# Patient Record
Sex: MALE | Race: WHITE | NOT HISPANIC OR LATINO | Employment: FULL TIME | URBAN - METROPOLITAN AREA
[De-identification: names, ages, dates, MRNs, and addresses within clinical notes are randomized per-mention and may not be internally consistent; named-entity substitution may affect disease eponyms.]

---

## 2017-06-06 ENCOUNTER — HISTORICAL (OUTPATIENT)
Dept: ADMINISTRATIVE | Facility: HOSPITAL | Age: 27
End: 2017-06-06

## 2017-06-06 LAB
ABS NEUT (OLG): 4.88 X10(3)/MCL (ref 2.1–9.2)
ABS NEUT (OLG): 4.89 X10(3)/MCL (ref 2.1–9.2)
ALBUMIN SERPL-MCNC: 4.4 GM/DL (ref 3.4–5)
ALBUMIN/GLOB SERPL: 1 RATIO (ref 1–2)
ALP SERPL-CCNC: 48 UNIT/L (ref 20–120)
ALT SERPL-CCNC: 26 UNIT/L
AST SERPL-CCNC: 26 UNIT/L
BASOPHILS # BLD AUTO: 0.07 X10(3)/MCL
BASOPHILS # BLD AUTO: 0.08 X10(3)/MCL
BASOPHILS NFR BLD AUTO: 1 % (ref 0–1)
BASOPHILS NFR BLD AUTO: 1 % (ref 0–1)
BILIRUB SERPL-MCNC: 0.4 MG/DL
BILIRUBIN DIRECT+TOT PNL SERPL-MCNC: <0.1 MG/DL
BILIRUBIN DIRECT+TOT PNL SERPL-MCNC: >0.3 MG/DL
BUN SERPL-MCNC: 17 MG/DL (ref 7–25)
CALCIUM SERPL-MCNC: 9 MG/DL (ref 8.4–10.3)
CD3+CD4+ CELLS # SPEC: 1110 UNIT/L (ref 589–1505)
CD3+CD4+ CELLS NFR BLD: 40.7 % (ref 31–59)
CHLORIDE SERPL-SCNC: 103 MMOL/L (ref 96–110)
CO2 SERPL-SCNC: 30 MMOL/L (ref 24–32)
CREAT SERPL-MCNC: 0.84 MG/DL (ref 0.7–1.4)
ERYTHROCYTE [DISTWIDTH] IN BLOOD BY AUTOMATED COUNT: 14.1 % (ref 11.5–14.5)
ERYTHROCYTE [DISTWIDTH] IN BLOOD BY AUTOMATED COUNT: 14.2 % (ref 11.5–14.5)
GLOBULIN SER-MCNC: 3.1 GM/ML (ref 2.3–3.5)
GLUCOSE SERPL-MCNC: 97 MG/DL (ref 65–99)
HCT VFR BLD AUTO: 42 % (ref 40–51)
HCT VFR BLD AUTO: 42.5 % (ref 40–51)
HCV AB SERPL QL IA: NONREACTIVE
HGB BLD-MCNC: 14 GM/DL (ref 13.5–17.5)
HGB BLD-MCNC: 14.2 GM/DL (ref 13.5–17.5)
IMM GRANULOCYTES # BLD AUTO: 0.02 10*3/UL
IMM GRANULOCYTES # BLD AUTO: 0.02 10*3/UL
IMM GRANULOCYTES NFR BLD AUTO: 0 %
IMM GRANULOCYTES NFR BLD AUTO: 0 %
LYMPHOCYTES # BLD AUTO: 2.69 X10(3)/MCL
LYMPHOCYTES # BLD AUTO: 2.73 X10(3)/MCL
LYMPHOCYTES # BLD AUTO: 2728 UNIT/L (ref 1260–5520)
LYMPHOCYTES NFR BLD AUTO: 31 % (ref 15–40)
LYMPHOCYTES NFR BLD AUTO: 31 % (ref 15–40)
LYMPHOCYTES NFR LN MANUAL: 31 % (ref 28–48)
LYMPHOMA - T-CELL MARKERS SPEC-IMP: NORMAL
MCH RBC QN AUTO: 31 PG (ref 26–34)
MCH RBC QN AUTO: 31.6 PG (ref 26–34)
MCHC RBC AUTO-ENTMCNC: 32.9 GM/DL (ref 31–37)
MCHC RBC AUTO-ENTMCNC: 33.8 GM/DL (ref 31–37)
MCV RBC AUTO: 93.5 FL (ref 80–100)
MCV RBC AUTO: 94.2 FL (ref 80–100)
MONOCYTES # BLD AUTO: 1.04 X10(3)/MCL
MONOCYTES # BLD AUTO: 1.07 X10(3)/MCL
MONOCYTES NFR BLD AUTO: 12 % (ref 4–12)
MONOCYTES NFR BLD AUTO: 12 % (ref 4–12)
NEUTROPHILS # BLD AUTO: 4.88 X10(3)/MCL
NEUTROPHILS # BLD AUTO: 4.89 X10(3)/MCL
NEUTROPHILS NFR BLD AUTO: 56 X10(3)/MCL
NEUTROPHILS NFR BLD AUTO: 56 X10(3)/MCL
PLATELET # BLD AUTO: 278 X10(3)/MCL (ref 130–400)
PLATELET # BLD AUTO: 286 X10(3)/MCL (ref 130–400)
PMV BLD AUTO: 8.8 FL (ref 7.4–10.4)
PMV BLD AUTO: 9.2 FL (ref 7.4–10.4)
POTASSIUM SERPL-SCNC: 3.7 MMOL/L (ref 3.6–5.2)
PROT SERPL-MCNC: 7.5 GM/DL (ref 6–8)
RBC # BLD AUTO: 4.49 X10(6)/MCL (ref 4.5–5.9)
RBC # BLD AUTO: 4.51 X10(6)/MCL (ref 4.5–5.9)
RPR SER QL: REACTIVE
SODIUM SERPL-SCNC: 140 MMOL/L (ref 135–146)
WBC # BLD AUTO: 8800 /MM3 (ref 4500–11500)
WBC # SPEC AUTO: 8.7 X10(3)/MCL (ref 4.5–11)
WBC # SPEC AUTO: 8.8 X10(3)/MCL (ref 4.5–11)

## 2017-09-11 ENCOUNTER — HISTORICAL (OUTPATIENT)
Dept: ADMINISTRATIVE | Facility: HOSPITAL | Age: 27
End: 2017-09-11

## 2017-09-11 LAB
ABS NEUT (OLG): 3.86 X10(3)/MCL (ref 2.1–9.2)
ABS NEUT (OLG): 4.03 X10(3)/MCL (ref 2.1–9.2)
ALBUMIN SERPL-MCNC: 3.9 GM/DL (ref 3.4–5)
ALBUMIN/GLOB SERPL: 1 RATIO (ref 1–2)
ALP SERPL-CCNC: 70 UNIT/L (ref 45–117)
ALT SERPL-CCNC: 23 UNIT/L (ref 12–78)
AST SERPL-CCNC: 19 UNIT/L (ref 15–37)
BASOPHILS # BLD AUTO: 0.05 X10(3)/MCL
BASOPHILS # BLD AUTO: 0.06 X10(3)/MCL
BASOPHILS NFR BLD AUTO: 1 % (ref 0–1)
BASOPHILS NFR BLD AUTO: 1 % (ref 0–1)
BILIRUB SERPL-MCNC: 0.3 MG/DL (ref 0.2–1)
BILIRUBIN DIRECT+TOT PNL SERPL-MCNC: 0.1 MG/DL
BILIRUBIN DIRECT+TOT PNL SERPL-MCNC: 0.2 MG/DL
BUN SERPL-MCNC: 11 MG/DL (ref 7–18)
CALCIUM SERPL-MCNC: 8.9 MG/DL (ref 8.5–10.1)
CD3+CD4+ CELLS # SPEC: 708 UNIT/L (ref 589–1505)
CD3+CD4+ CELLS NFR BLD: 42.9 % (ref 31–59)
CHLORIDE SERPL-SCNC: 106 MMOL/L (ref 98–107)
CO2 SERPL-SCNC: 30 MMOL/L (ref 21–32)
CREAT SERPL-MCNC: 0.8 MG/DL (ref 0.6–1.3)
ERYTHROCYTE [DISTWIDTH] IN BLOOD BY AUTOMATED COUNT: 13.6 % (ref 11.5–14.5)
ERYTHROCYTE [DISTWIDTH] IN BLOOD BY AUTOMATED COUNT: 13.7 % (ref 11.5–14.5)
GLOBULIN SER-MCNC: 4.4 GM/ML (ref 2.3–3.5)
GLUCOSE SERPL-MCNC: 91 MG/DL (ref 74–106)
HCT VFR BLD AUTO: 40.8 % (ref 40–51)
HCT VFR BLD AUTO: 40.9 % (ref 40–51)
HGB BLD-MCNC: 13.5 GM/DL (ref 13.5–17.5)
HGB BLD-MCNC: 13.7 GM/DL (ref 13.5–17.5)
IMM GRANULOCYTES # BLD AUTO: 0.05 10*3/UL
IMM GRANULOCYTES # BLD AUTO: 0.07 10*3/UL
IMM GRANULOCYTES NFR BLD AUTO: 1 %
IMM GRANULOCYTES NFR BLD AUTO: 1 %
LYMPHOCYTES # BLD AUTO: 1.63 X10(3)/MCL
LYMPHOCYTES # BLD AUTO: 1.65 X10(3)/MCL
LYMPHOCYTES # BLD AUTO: 1650 UNIT/L (ref 1260–5520)
LYMPHOCYTES NFR BLD AUTO: 24 % (ref 15–40)
LYMPHOCYTES NFR BLD AUTO: 25 % (ref 15–40)
LYMPHOCYTES NFR LN MANUAL: 25 % (ref 28–48)
LYMPHOMA - T-CELL MARKERS SPEC-IMP: ABNORMAL
MCH RBC QN AUTO: 30.9 PG (ref 26–34)
MCH RBC QN AUTO: 31.4 PG (ref 26–34)
MCHC RBC AUTO-ENTMCNC: 33.1 GM/DL (ref 31–37)
MCHC RBC AUTO-ENTMCNC: 33.5 GM/DL (ref 31–37)
MCV RBC AUTO: 93.4 FL (ref 80–100)
MCV RBC AUTO: 93.8 FL (ref 80–100)
MONOCYTES # BLD AUTO: 1.01 X10(3)/MCL
MONOCYTES # BLD AUTO: 1.05 X10(3)/MCL
MONOCYTES NFR BLD AUTO: 15 % (ref 4–12)
MONOCYTES NFR BLD AUTO: 15 % (ref 4–12)
NEUTROPHILS # BLD AUTO: 3.86 X10(3)/MCL
NEUTROPHILS # BLD AUTO: 4.03 X10(3)/MCL
NEUTROPHILS NFR BLD AUTO: 58 X10(3)/MCL
NEUTROPHILS NFR BLD AUTO: 59 X10(3)/MCL
PLATELET # BLD AUTO: 313 X10(3)/MCL (ref 130–400)
PLATELET # BLD AUTO: 321 X10(3)/MCL (ref 130–400)
PMV BLD AUTO: 9.1 FL (ref 7.4–10.4)
PMV BLD AUTO: 9.1 FL (ref 7.4–10.4)
POTASSIUM SERPL-SCNC: 3.9 MMOL/L (ref 3.5–5.1)
PROT SERPL-MCNC: 8.3 GM/DL (ref 6.4–8.2)
RBC # BLD AUTO: 4.36 X10(6)/MCL (ref 4.5–5.9)
RBC # BLD AUTO: 4.37 X10(6)/MCL (ref 4.5–5.9)
SODIUM SERPL-SCNC: 141 MMOL/L (ref 136–145)
WBC # BLD AUTO: 6600 /MM3 (ref 4500–11500)
WBC # SPEC AUTO: 6.6 X10(3)/MCL (ref 4.5–11)
WBC # SPEC AUTO: 6.8 X10(3)/MCL (ref 4.5–11)

## 2017-10-17 LAB
RAPID GROUP A STREP (OHS): NEGATIVE
RPR SER QL: REACTIVE
T PALLIDUM AB SER QL: REACTIVE

## 2018-02-14 ENCOUNTER — HISTORICAL (OUTPATIENT)
Dept: ADMINISTRATIVE | Facility: HOSPITAL | Age: 28
End: 2018-02-14

## 2018-02-14 LAB
ABS NEUT (OLG): 5.17 X10(3)/MCL (ref 2.1–9.2)
ABS NEUT (OLG): 5.37 X10(3)/MCL (ref 2.1–9.2)
ALBUMIN SERPL-MCNC: 3.8 GM/DL (ref 3.4–5)
ALBUMIN/GLOB SERPL: 1 RATIO (ref 1–2)
ALP SERPL-CCNC: 53 UNIT/L (ref 45–117)
ALT SERPL-CCNC: 26 UNIT/L (ref 12–78)
AST SERPL-CCNC: 14 UNIT/L (ref 15–37)
BASOPHILS # BLD AUTO: 0.08 X10(3)/MCL
BASOPHILS # BLD AUTO: 0.1 X10(3)/MCL
BASOPHILS NFR BLD AUTO: 1 % (ref 0–1)
BASOPHILS NFR BLD AUTO: 1 % (ref 0–1)
BILIRUB SERPL-MCNC: 0.2 MG/DL (ref 0.2–1)
BILIRUBIN DIRECT+TOT PNL SERPL-MCNC: <0.1 MG/DL
BILIRUBIN DIRECT+TOT PNL SERPL-MCNC: ABNORMAL MG/DL
BUN SERPL-MCNC: 17 MG/DL (ref 7–18)
CALCIUM SERPL-MCNC: 8.7 MG/DL (ref 8.5–10.1)
CD3+CD4+ CELLS # SPEC: 1033 UNIT/L (ref 589–1505)
CD3+CD4+ CELLS NFR BLD: 41.4 % (ref 31–59)
CHLORIDE SERPL-SCNC: 106 MMOL/L (ref 98–107)
CO2 SERPL-SCNC: 28 MMOL/L (ref 21–32)
CREAT SERPL-MCNC: 0.7 MG/DL (ref 0.6–1.3)
DEPRECATED CALCIDIOL+CALCIFEROL SERPL-MC: 20.12 NG/ML (ref 30–80)
EOSINOPHIL # BLD AUTO: 0.01 X10(3)/MCL
EOSINOPHIL NFR BLD AUTO: 0 % (ref 0–5)
ERYTHROCYTE [DISTWIDTH] IN BLOOD BY AUTOMATED COUNT: 13.8 % (ref 11.5–14.5)
ERYTHROCYTE [DISTWIDTH] IN BLOOD BY AUTOMATED COUNT: 13.9 % (ref 11.5–14.5)
GLOBULIN SER-MCNC: 4.2 GM/ML (ref 2.3–3.5)
GLUCOSE SERPL-MCNC: 89 MG/DL (ref 74–106)
HCT VFR BLD AUTO: 43.4 % (ref 40–51)
HCT VFR BLD AUTO: 43.7 % (ref 40–51)
HCV AB SERPL QL IA: NONREACTIVE
HGB BLD-MCNC: 14.2 GM/DL (ref 13.5–17.5)
HGB BLD-MCNC: 14.3 GM/DL (ref 13.5–17.5)
IMM GRANULOCYTES # BLD AUTO: 0.37 10*3/UL
IMM GRANULOCYTES # BLD AUTO: 0.46 10*3/UL
IMM GRANULOCYTES NFR BLD AUTO: 4 %
IMM GRANULOCYTES NFR BLD AUTO: 5 %
LYMPHOCYTES # BLD AUTO: 2.45 X10(3)/MCL
LYMPHOCYTES # BLD AUTO: 2.49 X10(3)/MCL
LYMPHOCYTES # BLD AUTO: 2496 UNIT/L (ref 1260–5520)
LYMPHOCYTES NFR BLD AUTO: 26 % (ref 15–40)
LYMPHOCYTES NFR BLD AUTO: 26 % (ref 15–40)
LYMPHOCYTES NFR LN MANUAL: 26 % (ref 28–48)
LYMPHOMA - T-CELL MARKERS SPEC-IMP: ABNORMAL
MCH RBC QN AUTO: 30.7 PG (ref 26–34)
MCH RBC QN AUTO: 31.1 PG (ref 26–34)
MCHC RBC AUTO-ENTMCNC: 32.5 GM/DL (ref 31–37)
MCHC RBC AUTO-ENTMCNC: 32.9 GM/DL (ref 31–37)
MCV RBC AUTO: 94.3 FL (ref 80–100)
MCV RBC AUTO: 94.4 FL (ref 80–100)
MONOCYTES # BLD AUTO: 1.36 X10(3)/MCL
MONOCYTES # BLD AUTO: 1.4 X10(3)/MCL
MONOCYTES NFR BLD AUTO: 14 % (ref 4–12)
MONOCYTES NFR BLD AUTO: 15 % (ref 4–12)
NEUTROPHILS # BLD AUTO: 5.17 X10(3)/MCL
NEUTROPHILS # BLD AUTO: 5.37 X10(3)/MCL
NEUTROPHILS NFR BLD AUTO: 54 X10(3)/MCL
NEUTROPHILS NFR BLD AUTO: 56 X10(3)/MCL
PLATELET # BLD AUTO: 355 X10(3)/MCL (ref 130–400)
PLATELET # BLD AUTO: 369 X10(3)/MCL (ref 130–400)
PMV BLD AUTO: 9.1 FL (ref 7.4–10.4)
PMV BLD AUTO: 9.2 FL (ref 7.4–10.4)
POTASSIUM SERPL-SCNC: 4.3 MMOL/L (ref 3.5–5.1)
PROT SERPL-MCNC: 8 GM/DL (ref 6.4–8.2)
RBC # BLD AUTO: 4.6 X10(6)/MCL (ref 4.5–5.9)
RBC # BLD AUTO: 4.63 X10(6)/MCL (ref 4.5–5.9)
RPR SER QL: REACTIVE
SODIUM SERPL-SCNC: 138 MMOL/L (ref 136–145)
T PALLIDUM AB SER QL: REACTIVE
TSH SERPL-ACNC: 1.82 MIU/L (ref 0.36–3.74)
WBC # BLD AUTO: 9600 /MM3 (ref 4500–11500)
WBC # SPEC AUTO: 9.6 X10(3)/MCL (ref 4.5–11)
WBC # SPEC AUTO: 9.7 X10(3)/MCL (ref 4.5–11)

## 2018-05-18 ENCOUNTER — HISTORICAL (OUTPATIENT)
Dept: ADMINISTRATIVE | Facility: HOSPITAL | Age: 28
End: 2018-05-18

## 2018-05-18 LAB
ABS NEUT (OLG): 4.33 X10(3)/MCL (ref 2.1–9.2)
ABS NEUT (OLG): 4.51 X10(3)/MCL (ref 2.1–9.2)
ALBUMIN SERPL-MCNC: 4.6 GM/DL (ref 3.4–5)
ALBUMIN/GLOB SERPL: 1 RATIO (ref 1–2)
ALP SERPL-CCNC: 55 UNIT/L (ref 45–117)
ALT SERPL-CCNC: 32 UNIT/L (ref 12–78)
AST SERPL-CCNC: 27 UNIT/L (ref 15–37)
BASOPHILS # BLD AUTO: 0.02 X10(3)/MCL
BASOPHILS # BLD AUTO: 0.02 X10(3)/MCL
BASOPHILS NFR BLD AUTO: 0 %
BASOPHILS NFR BLD AUTO: 0 %
BILIRUB SERPL-MCNC: 0.4 MG/DL (ref 0.2–1)
BILIRUBIN DIRECT+TOT PNL SERPL-MCNC: 0.2 MG/DL
BILIRUBIN DIRECT+TOT PNL SERPL-MCNC: 0.2 MG/DL
BUN SERPL-MCNC: 13 MG/DL (ref 7–18)
CALCIUM SERPL-MCNC: 9.4 MG/DL (ref 8.5–10.1)
CD3+CD4+ CELLS # SPEC: 741 UNIT/L (ref 589–1505)
CD3+CD4+ CELLS NFR BLD: 38.5 % (ref 31–59)
CHLORIDE SERPL-SCNC: 106 MMOL/L (ref 98–107)
CHOLEST SERPL-MCNC: 174 MG/DL
CHOLEST/HDLC SERPL: 4.5 {RATIO} (ref 0–5)
CO2 SERPL-SCNC: 27 MMOL/L (ref 21–32)
CREAT SERPL-MCNC: 1 MG/DL (ref 0.6–1.3)
ERYTHROCYTE [DISTWIDTH] IN BLOOD BY AUTOMATED COUNT: 13.5 % (ref 11.5–14.5)
ERYTHROCYTE [DISTWIDTH] IN BLOOD BY AUTOMATED COUNT: 13.7 % (ref 11.5–14.5)
EST. AVERAGE GLUCOSE BLD GHB EST-MCNC: 103 MG/DL
GLOBULIN SER-MCNC: 3.7 GM/ML (ref 2.3–3.5)
GLUCOSE SERPL-MCNC: 91 MG/DL (ref 74–106)
HBA1C MFR BLD: 5.2 % (ref 4.2–6.3)
HCT VFR BLD AUTO: 47.2 % (ref 40–51)
HCT VFR BLD AUTO: 48.5 % (ref 40–51)
HDLC SERPL-MCNC: 39 MG/DL
HGB BLD-MCNC: 15.5 GM/DL (ref 13.5–17.5)
HGB BLD-MCNC: 15.7 GM/DL (ref 13.5–17.5)
IMM GRANULOCYTES # BLD AUTO: 0.02 10*3/UL
IMM GRANULOCYTES # BLD AUTO: 0.03 10*3/UL
IMM GRANULOCYTES NFR BLD AUTO: 0 %
IMM GRANULOCYTES NFR BLD AUTO: 0 %
LDLC SERPL CALC-MCNC: 113 MG/DL (ref 0–130)
LYMPHOCYTES # BLD AUTO: 1.8 X10(3)/MCL
LYMPHOCYTES # BLD AUTO: 1.94 X10(3)/MCL
LYMPHOCYTES # BLD AUTO: 1924 UNIT/L (ref 1260–5520)
LYMPHOCYTES NFR BLD AUTO: 26 % (ref 13–40)
LYMPHOCYTES NFR BLD AUTO: 26 % (ref 13–40)
LYMPHOCYTES NFR LN MANUAL: 26 % (ref 28–48)
LYMPHOMA - T-CELL MARKERS SPEC-IMP: ABNORMAL
MCH RBC QN AUTO: 30.2 PG (ref 26–34)
MCH RBC QN AUTO: 30.6 PG (ref 26–34)
MCHC RBC AUTO-ENTMCNC: 32.4 GM/DL (ref 31–37)
MCHC RBC AUTO-ENTMCNC: 32.8 GM/DL (ref 31–37)
MCV RBC AUTO: 93.1 FL (ref 80–100)
MCV RBC AUTO: 93.3 FL (ref 80–100)
MONOCYTES # BLD AUTO: 0.88 X10(3)/MCL
MONOCYTES # BLD AUTO: 0.96 X10(3)/MCL
MONOCYTES NFR BLD AUTO: 12 % (ref 4–12)
MONOCYTES NFR BLD AUTO: 13 % (ref 4–12)
NEUTROPHILS # BLD AUTO: 4.33 X10(3)/MCL
NEUTROPHILS # BLD AUTO: 4.51 X10(3)/MCL
NEUTROPHILS NFR BLD AUTO: 60 X10(3)/MCL
NEUTROPHILS NFR BLD AUTO: 61 X10(3)/MCL
PLATELET # BLD AUTO: 264 X10(3)/MCL (ref 130–400)
PLATELET # BLD AUTO: 265 X10(3)/MCL (ref 130–400)
PMV BLD AUTO: 10 FL (ref 7.4–10.4)
PMV BLD AUTO: 9.9 FL (ref 7.4–10.4)
POTASSIUM SERPL-SCNC: 3.9 MMOL/L (ref 3.5–5.1)
PROT SERPL-MCNC: 8.3 GM/DL (ref 6.4–8.2)
RBC # BLD AUTO: 5.07 X10(6)/MCL (ref 4.5–5.9)
RBC # BLD AUTO: 5.2 X10(6)/MCL (ref 4.5–5.9)
RPR SER QL: REACTIVE
SODIUM SERPL-SCNC: 137 MMOL/L (ref 136–145)
T PALLIDUM AB SER QL: REACTIVE
TRIGL SERPL-MCNC: 109 MG/DL
VLDLC SERPL CALC-MCNC: 22 MG/DL
WBC # BLD AUTO: 7400 /MM3 (ref 4500–11500)
WBC # SPEC AUTO: 7.1 X10(3)/MCL (ref 4.5–11)
WBC # SPEC AUTO: 7.4 X10(3)/MCL (ref 4.5–11)

## 2018-08-29 ENCOUNTER — HISTORICAL (OUTPATIENT)
Dept: ADMINISTRATIVE | Facility: HOSPITAL | Age: 28
End: 2018-08-29

## 2018-08-29 LAB
ABS NEUT (OLG): 5.42 X10(3)/MCL (ref 2.1–9.2)
ALBUMIN SERPL-MCNC: 4.3 GM/DL (ref 3.4–5)
ALBUMIN/GLOB SERPL: 1 RATIO (ref 1–2)
ALP SERPL-CCNC: 69 UNIT/L (ref 45–117)
ALT SERPL-CCNC: 20 UNIT/L (ref 12–78)
AST SERPL-CCNC: 14 UNIT/L (ref 15–37)
BASOPHILS # BLD AUTO: 0.05 X10(3)/MCL
BASOPHILS NFR BLD AUTO: 1 %
BILIRUB SERPL-MCNC: 0.4 MG/DL (ref 0.2–1)
BILIRUBIN DIRECT+TOT PNL SERPL-MCNC: 0.1 MG/DL
BILIRUBIN DIRECT+TOT PNL SERPL-MCNC: 0.3 MG/DL
BUN SERPL-MCNC: 12 MG/DL (ref 7–18)
CALCIUM SERPL-MCNC: 8.7 MG/DL (ref 8.5–10.1)
CD3+CD4+ CELLS # SPEC: 589 UNIT/L (ref 589–1505)
CD3+CD4+ CELLS NFR BLD: 40.9 % (ref 31–59)
CHLORIDE SERPL-SCNC: 102 MMOL/L (ref 98–107)
CO2 SERPL-SCNC: 30 MMOL/L (ref 21–32)
CREAT SERPL-MCNC: 0.9 MG/DL (ref 0.6–1.3)
ERYTHROCYTE [DISTWIDTH] IN BLOOD BY AUTOMATED COUNT: 13.8 % (ref 11.5–14.5)
GLOBULIN SER-MCNC: 4.3 GM/ML (ref 2.3–3.5)
GLUCOSE SERPL-MCNC: 94 MG/DL (ref 74–106)
HCT VFR BLD AUTO: 42.6 % (ref 40–51)
HGB BLD-MCNC: 14.5 GM/DL (ref 13.5–17.5)
IMM GRANULOCYTES # BLD AUTO: 0.06 10*3/UL
IMM GRANULOCYTES NFR BLD AUTO: 1 %
LYMPHOCYTES # BLD AUTO: 1.4 X10(3)/MCL
LYMPHOCYTES # BLD AUTO: 1440 UNIT/L (ref 1260–5520)
LYMPHOCYTES NFR BLD AUTO: 18 % (ref 13–40)
LYMPHOCYTES NFR LN MANUAL: 18 % (ref 28–48)
LYMPHOMA - T-CELL MARKERS SPEC-IMP: ABNORMAL
MCH RBC QN AUTO: 31.9 PG (ref 26–34)
MCHC RBC AUTO-ENTMCNC: 34 GM/DL (ref 31–37)
MCV RBC AUTO: 93.8 FL (ref 80–100)
MONOCYTES # BLD AUTO: 1.08 X10(3)/MCL
MONOCYTES NFR BLD AUTO: 14 % (ref 4–12)
NEUTROPHILS # BLD AUTO: 5.42 X10(3)/MCL
NEUTROPHILS NFR BLD AUTO: 68 X10(3)/MCL
PLATELET # BLD AUTO: 279 X10(3)/MCL (ref 130–400)
PMV BLD AUTO: 9.3 FL (ref 7.4–10.4)
POTASSIUM SERPL-SCNC: 3.4 MMOL/L (ref 3.5–5.1)
PROT SERPL-MCNC: 8.6 GM/DL (ref 6.4–8.2)
RBC # BLD AUTO: 4.54 X10(6)/MCL (ref 4.5–5.9)
RPR SER QL: REACTIVE
SODIUM SERPL-SCNC: 136 MMOL/L (ref 136–145)
T PALLIDUM AB SER QL: REACTIVE
WBC # BLD AUTO: 8000 /MM3 (ref 4500–11500)
WBC # SPEC AUTO: 8 X10(3)/MCL (ref 4.5–11)

## 2019-02-14 ENCOUNTER — HISTORICAL (OUTPATIENT)
Dept: ADMINISTRATIVE | Facility: HOSPITAL | Age: 29
End: 2019-02-14

## 2019-02-14 LAB
ABS NEUT (OLG): 3.92 X10(3)/MCL (ref 2.1–9.2)
ALBUMIN SERPL-MCNC: 4.2 GM/DL (ref 3.4–5)
ALBUMIN/GLOB SERPL: 1.2 RATIO (ref 1.1–2)
ALP SERPL-CCNC: 50 UNIT/L (ref 45–117)
ALT SERPL-CCNC: 24 UNIT/L (ref 12–78)
AST SERPL-CCNC: 16 UNIT/L (ref 15–37)
BASOPHILS # BLD AUTO: 0.05 X10(3)/MCL
BASOPHILS NFR BLD AUTO: 1 %
BILIRUB SERPL-MCNC: 0.5 MG/DL (ref 0.2–1)
BILIRUBIN DIRECT+TOT PNL SERPL-MCNC: 0.1 MG/DL
BILIRUBIN DIRECT+TOT PNL SERPL-MCNC: 0.4 MG/DL
BUN SERPL-MCNC: 12 MG/DL (ref 7–18)
CALCIUM SERPL-MCNC: 8.9 MG/DL (ref 8.5–10.1)
CD3+CD4+ CELLS # SPEC: 1021 UNIT/L (ref 589–1505)
CD3+CD4+ CELLS NFR BLD: 40.6 % (ref 31–59)
CHLORIDE SERPL-SCNC: 105 MMOL/L (ref 98–107)
CO2 SERPL-SCNC: 33 MMOL/L (ref 21–32)
CREAT SERPL-MCNC: 0.9 MG/DL (ref 0.6–1.3)
EOSINOPHIL # BLD AUTO: 0.01 X10(3)/MCL
EOSINOPHIL NFR BLD AUTO: 0 %
ERYTHROCYTE [DISTWIDTH] IN BLOOD BY AUTOMATED COUNT: 13.3 % (ref 11.5–14.5)
GLOBULIN SER-MCNC: 3.6 GM/ML (ref 2.3–3.5)
GLUCOSE SERPL-MCNC: 91 MG/DL (ref 74–106)
HCT VFR BLD AUTO: 43.4 % (ref 40–51)
HCV AB SERPL QL IA: NONREACTIVE
HGB BLD-MCNC: 14.4 GM/DL (ref 13.5–17.5)
IMM GRANULOCYTES # BLD AUTO: 0.01 10*3/UL
IMM GRANULOCYTES NFR BLD AUTO: 0 %
LYMPHOCYTES # BLD AUTO: 2.49 X10(3)/MCL
LYMPHOCYTES # BLD AUTO: 2516 UNIT/L (ref 1260–5520)
LYMPHOCYTES NFR BLD AUTO: 34 % (ref 13–40)
LYMPHOCYTES NFR LN MANUAL: 34 % (ref 28–48)
LYMPHOMA - T-CELL MARKERS SPEC-IMP: NORMAL
MCH RBC QN AUTO: 31.9 PG (ref 26–34)
MCHC RBC AUTO-ENTMCNC: 33.2 GM/DL (ref 31–37)
MCV RBC AUTO: 96 FL (ref 80–100)
MONOCYTES # BLD AUTO: 0.91 X10(3)/MCL
MONOCYTES NFR BLD AUTO: 12 % (ref 4–12)
NEUTROPHILS # BLD AUTO: 3.92 X10(3)/MCL
NEUTROPHILS NFR BLD AUTO: 53 X10(3)/MCL
PLATELET # BLD AUTO: 232 X10(3)/MCL (ref 130–400)
PMV BLD AUTO: 9.7 FL (ref 7.4–10.4)
POTASSIUM SERPL-SCNC: 3.5 MMOL/L (ref 3.5–5.1)
PROT SERPL-MCNC: 7.8 GM/DL (ref 6.4–8.2)
RBC # BLD AUTO: 4.52 X10(6)/MCL (ref 4.5–5.9)
RPR SER QL: REACTIVE
SODIUM SERPL-SCNC: 141 MMOL/L (ref 136–145)
T PALLIDUM AB SER QL: REACTIVE
WBC # BLD AUTO: 7400 /MM3 (ref 4500–11500)
WBC # SPEC AUTO: 7.4 X10(3)/MCL (ref 4.5–11)

## 2019-05-16 ENCOUNTER — HISTORICAL (OUTPATIENT)
Dept: ADMINISTRATIVE | Facility: HOSPITAL | Age: 29
End: 2019-05-16

## 2019-05-16 LAB
ABS NEUT (OLG): 4.93 X10(3)/MCL (ref 2.1–9.2)
ALBUMIN SERPL-MCNC: 4.2 GM/DL (ref 3.4–5)
ALBUMIN/GLOB SERPL: 1.2 RATIO (ref 1.1–2)
ALP SERPL-CCNC: 48 UNIT/L (ref 45–117)
ALT SERPL-CCNC: 31 UNIT/L (ref 12–78)
APPEARANCE, UA: CLEAR
AST SERPL-CCNC: 23 UNIT/L (ref 15–37)
BACTERIA #/AREA URNS AUTO: ABNORMAL /[HPF]
BASOPHILS # BLD AUTO: 0.05 X10(3)/MCL
BASOPHILS NFR BLD AUTO: 1 %
BILIRUB SERPL-MCNC: 0.4 MG/DL (ref 0.2–1)
BILIRUB UR QL STRIP: NEGATIVE
BILIRUBIN DIRECT+TOT PNL SERPL-MCNC: 0.1 MG/DL
BILIRUBIN DIRECT+TOT PNL SERPL-MCNC: 0.3 MG/DL
BUN SERPL-MCNC: 16 MG/DL (ref 7–18)
CALCIUM SERPL-MCNC: 8.9 MG/DL (ref 8.5–10.1)
CD3+CD4+ CELLS # SPEC: 857 UNIT/L (ref 589–1505)
CD3+CD4+ CELLS NFR BLD: 41.8 % (ref 31–59)
CHLORIDE SERPL-SCNC: 104 MMOL/L (ref 98–107)
CHOLEST SERPL-MCNC: 169 MG/DL
CHOLEST/HDLC SERPL: 3 {RATIO} (ref 0–5)
CO2 SERPL-SCNC: 30 MMOL/L (ref 21–32)
COLOR UR: YELLOW
CREAT SERPL-MCNC: 0.9 MG/DL (ref 0.6–1.3)
DEPRECATED CALCIDIOL+CALCIFEROL SERPL-MC: 25.98 NG/ML (ref 30–80)
EOSINOPHIL # BLD AUTO: 0.01 X10(3)/MCL
EOSINOPHIL NFR BLD AUTO: 0 %
ERYTHROCYTE [DISTWIDTH] IN BLOOD BY AUTOMATED COUNT: 13.2 % (ref 11.5–14.5)
EST. AVERAGE GLUCOSE BLD GHB EST-MCNC: 103 MG/DL
GLOBULIN SER-MCNC: 3.6 GM/ML (ref 2.3–3.5)
GLUCOSE (UA): NORMAL
GLUCOSE SERPL-MCNC: 104 MG/DL (ref 74–106)
HBA1C MFR BLD: 5.2 % (ref 4.2–6.3)
HCT VFR BLD AUTO: 42.2 % (ref 40–51)
HCV AB SERPL QL IA: NONREACTIVE
HDLC SERPL-MCNC: 56 MG/DL
HGB BLD-MCNC: 13.9 GM/DL (ref 13.5–17.5)
HGB UR QL STRIP: NEGATIVE
HYALINE CASTS #/AREA URNS LPF: ABNORMAL /[LPF]
IMM GRANULOCYTES # BLD AUTO: 0.02 10*3/UL
IMM GRANULOCYTES NFR BLD AUTO: 0 %
KETONES UR QL STRIP: ABNORMAL
LDLC SERPL CALC-MCNC: 101 MG/DL (ref 0–130)
LEUKOCYTE ESTERASE UR QL STRIP: NEGATIVE
LYMPHOCYTES # BLD AUTO: 2.04 X10(3)/MCL
LYMPHOCYTES # BLD AUTO: 2050 UNIT/L (ref 1260–5520)
LYMPHOCYTES NFR BLD AUTO: 25 % (ref 13–40)
LYMPHOCYTES NFR LN MANUAL: 25 % (ref 28–48)
LYMPHOMA - T-CELL MARKERS SPEC-IMP: ABNORMAL
MCH RBC QN AUTO: 31.9 PG (ref 26–34)
MCHC RBC AUTO-ENTMCNC: 32.9 GM/DL (ref 31–37)
MCV RBC AUTO: 96.8 FL (ref 80–100)
MONOCYTES # BLD AUTO: 1.18 X10(3)/MCL
MONOCYTES NFR BLD AUTO: 14 % (ref 4–12)
NEUTROPHILS # BLD AUTO: 4.93 X10(3)/MCL
NEUTROPHILS NFR BLD AUTO: 60 X10(3)/MCL
NITRITE UR QL STRIP: NEGATIVE
PH UR STRIP: 5.5 [PH] (ref 4.5–8)
PLATELET # BLD AUTO: 257 X10(3)/MCL (ref 130–400)
PMV BLD AUTO: 9.6 FL (ref 7.4–10.4)
POTASSIUM SERPL-SCNC: 4 MMOL/L (ref 3.5–5.1)
PROT SERPL-MCNC: 7.8 GM/DL (ref 6.4–8.2)
PROT UR QL STRIP: 50 MG/DL
RBC # BLD AUTO: 4.36 X10(6)/MCL (ref 4.5–5.9)
RBC #/AREA URNS AUTO: ABNORMAL /[HPF]
RPR SER QL: REACTIVE
SODIUM SERPL-SCNC: 137 MMOL/L (ref 136–145)
SP GR UR STRIP: 1.04 (ref 1–1.03)
SQUAMOUS #/AREA URNS LPF: ABNORMAL /[LPF]
T PALLIDUM AB SER QL: REACTIVE
TRIGL SERPL-MCNC: 59 MG/DL
TSH SERPL-ACNC: 1.49 MIU/L (ref 0.36–3.74)
UROBILINOGEN UR STRIP-ACNC: 2 MG/DL
VLDLC SERPL CALC-MCNC: 12 MG/DL
WBC # BLD AUTO: 8200 /MM3 (ref 4500–11500)
WBC # SPEC AUTO: 8.2 X10(3)/MCL (ref 4.5–11)
WBC #/AREA URNS AUTO: ABNORMAL /HPF

## 2019-08-19 ENCOUNTER — HISTORICAL (OUTPATIENT)
Dept: ADMINISTRATIVE | Facility: HOSPITAL | Age: 29
End: 2019-08-19

## 2019-08-19 LAB
ABS NEUT (OLG): 5.26 X10(3)/MCL (ref 2.1–9.2)
ALBUMIN SERPL-MCNC: 4.7 GM/DL (ref 3.4–5)
ALBUMIN/GLOB SERPL: 1.2 RATIO (ref 1.1–2)
ALP SERPL-CCNC: 53 UNIT/L (ref 45–117)
ALT SERPL-CCNC: 34 UNIT/L (ref 12–78)
AST SERPL-CCNC: 28 UNIT/L (ref 15–37)
BASOPHILS # BLD AUTO: 0.09 X10(3)/MCL
BASOPHILS NFR BLD AUTO: 1 %
BILIRUB SERPL-MCNC: 0.9 MG/DL (ref 0.2–1)
BILIRUBIN DIRECT+TOT PNL SERPL-MCNC: 0.2 MG/DL
BILIRUBIN DIRECT+TOT PNL SERPL-MCNC: 0.7 MG/DL
BUN SERPL-MCNC: 17 MG/DL (ref 7–18)
CALCIUM SERPL-MCNC: 9.2 MG/DL (ref 8.5–10.1)
CD3+CD4+ CELLS # SPEC: 1086 UNIT/L (ref 589–1505)
CD3+CD4+ CELLS NFR BLD: 40 % (ref 31–59)
CHLORIDE SERPL-SCNC: 105 MMOL/L (ref 98–107)
CO2 SERPL-SCNC: 28 MMOL/L (ref 21–32)
CREAT SERPL-MCNC: 0.9 MG/DL (ref 0.6–1.3)
EOSINOPHIL # BLD AUTO: 0.06 10*3/UL
EOSINOPHIL NFR BLD AUTO: 1 %
ERYTHROCYTE [DISTWIDTH] IN BLOOD BY AUTOMATED COUNT: 13.4 % (ref 11.5–14.5)
GLOBULIN SER-MCNC: 3.8 GM/ML (ref 2.3–3.5)
GLUCOSE SERPL-MCNC: 87 MG/DL (ref 74–106)
HCT VFR BLD AUTO: 44.5 % (ref 40–51)
HCV AB SERPL QL IA: NONREACTIVE
HGB BLD-MCNC: 14.7 GM/DL (ref 13.5–17.5)
IMM GRANULOCYTES # BLD AUTO: 0.03 10*3/UL
IMM GRANULOCYTES NFR BLD AUTO: 0 %
LYMPHOCYTES # BLD AUTO: 2.66 X10(3)/MCL
LYMPHOCYTES # BLD AUTO: 2716 UNIT/L (ref 1260–5520)
LYMPHOCYTES NFR BLD AUTO: 28 % (ref 13–40)
LYMPHOCYTES NFR LN MANUAL: 28 % (ref 28–48)
LYMPHOMA - T-CELL MARKERS SPEC-IMP: NORMAL
MCH RBC QN AUTO: 31.5 PG (ref 26–34)
MCHC RBC AUTO-ENTMCNC: 33 GM/DL (ref 31–37)
MCV RBC AUTO: 95.5 FL (ref 80–100)
MONOCYTES # BLD AUTO: 1.58 X10(3)/MCL
MONOCYTES NFR BLD AUTO: 16 % (ref 0–24)
NEUTROPHILS # BLD AUTO: 5.26 X10(3)/MCL
NEUTROPHILS NFR BLD AUTO: 54 X10(3)/MCL
PLATELET # BLD AUTO: 273 X10(3)/MCL (ref 130–400)
PMV BLD AUTO: 9.4 FL (ref 7.4–10.4)
POTASSIUM SERPL-SCNC: 3.8 MMOL/L (ref 3.5–5.1)
PROT SERPL-MCNC: 8.5 GM/DL (ref 6.4–8.2)
RBC # BLD AUTO: 4.66 X10(6)/MCL (ref 4.5–5.9)
RPR SER QL: REACTIVE
SODIUM SERPL-SCNC: 139 MMOL/L (ref 136–145)
T PALLIDUM AB SER QL: REACTIVE
WBC # BLD AUTO: 9700 /MM3 (ref 4500–11500)
WBC # SPEC AUTO: 9.7 X10(3)/MCL (ref 4.5–11)

## 2020-01-14 ENCOUNTER — HISTORICAL (OUTPATIENT)
Dept: ADMINISTRATIVE | Facility: HOSPITAL | Age: 30
End: 2020-01-14

## 2020-01-14 LAB
ABS NEUT (OLG): 3.55 X10(3)/MCL (ref 2.1–9.2)
ALBUMIN SERPL-MCNC: 4.1 GM/DL (ref 3.4–5)
ALBUMIN/GLOB SERPL: 1.2 RATIO (ref 1.1–2)
ALP SERPL-CCNC: 49 UNIT/L (ref 45–117)
ALT SERPL-CCNC: 24 UNIT/L (ref 12–78)
AST SERPL-CCNC: 12 UNIT/L (ref 15–37)
BASOPHILS # BLD AUTO: 0.1 X10(3)/MCL (ref 0–0.2)
BASOPHILS NFR BLD AUTO: 1 %
BILIRUB SERPL-MCNC: 0.3 MG/DL (ref 0.2–1)
BILIRUBIN DIRECT+TOT PNL SERPL-MCNC: <0.1 MG/DL (ref 0–0.2)
BILIRUBIN DIRECT+TOT PNL SERPL-MCNC: ABNORMAL MG/DL
BUN SERPL-MCNC: 12 MG/DL (ref 7–18)
CALCIUM SERPL-MCNC: 8.9 MG/DL (ref 8.5–10.1)
CD3+CD4+ CELLS # SPEC: 812 UNIT/L (ref 589–1505)
CD3+CD4+ CELLS NFR BLD: 40.4 % (ref 31–59)
CHLORIDE SERPL-SCNC: 106 MMOL/L (ref 98–107)
CO2 SERPL-SCNC: 29 MMOL/L (ref 21–32)
CREAT SERPL-MCNC: 0.8 MG/DL (ref 0.6–1.3)
EOSINOPHIL # BLD AUTO: 0.1 X10(3)/MCL (ref 0–0.9)
EOSINOPHIL NFR BLD AUTO: 1 %
ERYTHROCYTE [DISTWIDTH] IN BLOOD BY AUTOMATED COUNT: 13.6 % (ref 11.5–14.5)
GLOBULIN SER-MCNC: 3.4 GM/ML (ref 2.3–3.5)
GLUCOSE SERPL-MCNC: 68 MG/DL (ref 74–106)
HCT VFR BLD AUTO: 43.2 % (ref 40–51)
HCV AB SERPL QL IA: NONREACTIVE
HGB BLD-MCNC: 13.8 GM/DL (ref 13.5–17.5)
IMM GRANULOCYTES # BLD AUTO: 0.09 10*3/UL
IMM GRANULOCYTES NFR BLD AUTO: 1 %
LYMPHOCYTES # BLD AUTO: 2 X10(3)/MCL (ref 0.6–4.6)
LYMPHOCYTES # BLD AUTO: 2010 UNIT/L (ref 1260–5520)
LYMPHOCYTES NFR BLD AUTO: 30 %
LYMPHOCYTES NFR LN MANUAL: 30 % (ref 28–48)
LYMPHOMA - T-CELL MARKERS SPEC-IMP: NORMAL
MCH RBC QN AUTO: 32 PG (ref 26–34)
MCHC RBC AUTO-ENTMCNC: 31.9 GM/DL (ref 31–37)
MCV RBC AUTO: 100.2 FL (ref 80–100)
MONOCYTES # BLD AUTO: 0.9 X10(3)/MCL (ref 0.1–1.3)
MONOCYTES NFR BLD AUTO: 14 %
NEUTROPHILS # BLD AUTO: 3.55 X10(3)/MCL (ref 2.1–9.2)
NEUTROPHILS NFR BLD AUTO: 53 %
PLATELET # BLD AUTO: 236 X10(3)/MCL (ref 130–400)
PMV BLD AUTO: 9.6 FL (ref 7.4–10.4)
POTASSIUM SERPL-SCNC: 3.9 MMOL/L (ref 3.5–5.1)
PROT SERPL-MCNC: 7.5 GM/DL (ref 6.4–8.2)
RBC # BLD AUTO: 4.31 X10(6)/MCL (ref 4.5–5.9)
RPR SER QL: REACTIVE
SODIUM SERPL-SCNC: 140 MMOL/L (ref 136–145)
T PALLIDUM AB SER QL: REACTIVE
WBC # BLD AUTO: 6700 /MM3 (ref 4500–11500)
WBC # SPEC AUTO: 6.7 X10(3)/MCL (ref 4.5–11)

## 2020-04-07 ENCOUNTER — HISTORICAL (OUTPATIENT)
Dept: RADIOLOGY | Facility: HOSPITAL | Age: 30
End: 2020-04-07

## 2020-06-29 ENCOUNTER — HISTORICAL (OUTPATIENT)
Dept: ADMINISTRATIVE | Facility: HOSPITAL | Age: 30
End: 2020-06-29

## 2020-06-29 LAB
ABS NEUT (OLG): 4.18 X10(3)/MCL (ref 2.1–9.2)
ALBUMIN SERPL-MCNC: 4.4 GM/DL (ref 3.4–5)
ALBUMIN/GLOB SERPL: 1.2 RATIO (ref 1.1–2)
ALP SERPL-CCNC: 45 UNIT/L (ref 45–117)
ALT SERPL-CCNC: 31 UNIT/L (ref 12–78)
APPEARANCE, UA: CLEAR
AST SERPL-CCNC: 19 UNIT/L (ref 15–37)
BACTERIA #/AREA URNS AUTO: ABNORMAL /HPF
BASOPHILS # BLD AUTO: 0 X10(3)/MCL (ref 0–0.2)
BASOPHILS NFR BLD AUTO: 1 %
BILIRUB SERPL-MCNC: 0.6 MG/DL (ref 0.2–1)
BILIRUB UR QL STRIP: NEGATIVE
BILIRUBIN DIRECT+TOT PNL SERPL-MCNC: 0.1 MG/DL (ref 0–0.2)
BILIRUBIN DIRECT+TOT PNL SERPL-MCNC: 0.5 MG/DL
BUN SERPL-MCNC: 18 MG/DL (ref 7–18)
CALCIUM SERPL-MCNC: 8.8 MG/DL (ref 8.5–10.1)
CD3+CD4+ CELLS # SPEC: 718 UNIT/L (ref 589–1505)
CD3+CD4+ CELLS NFR BLD: 38.9 % (ref 31–59)
CHLORIDE SERPL-SCNC: 105 MMOL/L (ref 98–107)
CHOLEST SERPL-MCNC: 158 MG/DL
CHOLEST/HDLC SERPL: 3.5 {RATIO} (ref 0–5)
CO2 SERPL-SCNC: 27 MMOL/L (ref 21–32)
COLOR UR: NORMAL
CREAT SERPL-MCNC: 1 MG/DL (ref 0.6–1.3)
DEPRECATED CALCIDIOL+CALCIFEROL SERPL-MC: 58.4 NG/ML (ref 30–80)
EOSINOPHIL # BLD AUTO: 0 X10(3)/MCL (ref 0–0.9)
EOSINOPHIL NFR BLD AUTO: 1 %
ERYTHROCYTE [DISTWIDTH] IN BLOOD BY AUTOMATED COUNT: 13.8 % (ref 11.5–14.5)
EST. AVERAGE GLUCOSE BLD GHB EST-MCNC: 114 MG/DL
GLOBULIN SER-MCNC: 3.7 GM/ML (ref 2.3–3.5)
GLUCOSE (UA): NEGATIVE
GLUCOSE SERPL-MCNC: 93 MG/DL (ref 74–106)
HBA1C MFR BLD: 5.6 % (ref 4.2–6.3)
HCT VFR BLD AUTO: 45.1 % (ref 40–51)
HCV AB SERPL QL IA: NONREACTIVE
HDLC SERPL-MCNC: 45 MG/DL (ref 40–59)
HGB BLD-MCNC: 15.1 GM/DL (ref 13.5–17.5)
HGB UR QL STRIP: NEGATIVE
HYALINE CASTS #/AREA URNS LPF: ABNORMAL /LPF
IMM GRANULOCYTES # BLD AUTO: 0.02 10*3/UL
IMM GRANULOCYTES NFR BLD AUTO: 0 %
KETONES UR QL STRIP: NEGATIVE
LDLC SERPL CALC-MCNC: 87 MG/DL
LEUKOCYTE ESTERASE UR QL STRIP: NEGATIVE
LYMPHOCYTES # BLD AUTO: 1.9 X10(3)/MCL (ref 0.6–4.6)
LYMPHOCYTES # BLD AUTO: 1846 UNIT/L (ref 1260–5520)
LYMPHOCYTES NFR BLD AUTO: 26 %
LYMPHOCYTES NFR LN MANUAL: 26 % (ref 28–48)
LYMPHOMA - T-CELL MARKERS SPEC-IMP: ABNORMAL
MCH RBC QN AUTO: 32.6 PG (ref 26–34)
MCHC RBC AUTO-ENTMCNC: 33.5 GM/DL (ref 31–37)
MCV RBC AUTO: 97.4 FL (ref 80–100)
MONOCYTES # BLD AUTO: 0.9 X10(3)/MCL (ref 0.1–1.3)
MONOCYTES NFR BLD AUTO: 13 %
NEUTROPHILS # BLD AUTO: 4.18 X10(3)/MCL (ref 2.1–9.2)
NEUTROPHILS NFR BLD AUTO: 59 %
NITRITE UR QL STRIP: NEGATIVE
PH UR STRIP: 5.5 [PH] (ref 4.5–8)
PLATELET # BLD AUTO: 251 X10(3)/MCL (ref 130–400)
PMV BLD AUTO: 10.2 FL (ref 7.4–10.4)
POTASSIUM SERPL-SCNC: 3.9 MMOL/L (ref 3.5–5.1)
PROT SERPL-MCNC: 8.1 GM/DL (ref 6.4–8.2)
PROT UR QL STRIP: NEGATIVE
RBC # BLD AUTO: 4.63 X10(6)/MCL (ref 4.5–5.9)
RBC #/AREA URNS AUTO: ABNORMAL /HPF
RPR SER QL: REACTIVE
SODIUM SERPL-SCNC: 136 MMOL/L (ref 136–145)
SP GR UR STRIP: 1.02 (ref 1–1.03)
SQUAMOUS #/AREA URNS LPF: ABNORMAL /LPF
T PALLIDUM AB SER QL: REACTIVE
TRIGL SERPL-MCNC: 130 MG/DL
TSH SERPL-ACNC: 1.27 MIU/L (ref 0.36–3.74)
UROBILINOGEN UR STRIP-ACNC: NORMAL
VLDLC SERPL CALC-MCNC: 26 MG/DL
WBC # BLD AUTO: 7100 /MM3 (ref 4500–11500)
WBC # SPEC AUTO: 7.1 X10(3)/MCL (ref 4.5–11)
WBC #/AREA URNS AUTO: ABNORMAL /HPF

## 2020-10-07 ENCOUNTER — HISTORICAL (OUTPATIENT)
Dept: ADMINISTRATIVE | Facility: HOSPITAL | Age: 30
End: 2020-10-07

## 2020-10-07 LAB
ABS NEUT (OLG): 3.22 X10(3)/MCL (ref 2.1–9.2)
ALBUMIN SERPL-MCNC: 4 GM/DL (ref 3.4–5)
ALBUMIN/GLOB SERPL: 1.1 RATIO (ref 1.1–2)
ALP SERPL-CCNC: 40 UNIT/L (ref 45–117)
ALT SERPL-CCNC: 28 UNIT/L (ref 12–78)
AST SERPL-CCNC: 16 UNIT/L (ref 15–37)
BASOPHILS # BLD AUTO: 0 X10(3)/MCL (ref 0–0.2)
BASOPHILS NFR BLD AUTO: 0 %
BILIRUB SERPL-MCNC: 0.2 MG/DL (ref 0.2–1)
BILIRUBIN DIRECT+TOT PNL SERPL-MCNC: <0.1 MG/DL (ref 0–0.2)
BILIRUBIN DIRECT+TOT PNL SERPL-MCNC: ABNORMAL MG/DL
BUN SERPL-MCNC: 16 MG/DL (ref 7–18)
CALCIUM SERPL-MCNC: 9.2 MG/DL (ref 8.5–10.1)
CD3+CD4+ CELLS # SPEC: 740 UNIT/L (ref 589–1505)
CD3+CD4+ CELLS NFR BLD: 40.1 % (ref 31–59)
CHLORIDE SERPL-SCNC: 109 MMOL/L (ref 98–107)
CO2 SERPL-SCNC: 28 MMOL/L (ref 21–32)
CREAT SERPL-MCNC: 0.8 MG/DL (ref 0.6–1.3)
EOSINOPHIL # BLD AUTO: 0.1 X10(3)/MCL (ref 0–0.9)
EOSINOPHIL NFR BLD AUTO: 1 %
ERYTHROCYTE [DISTWIDTH] IN BLOOD BY AUTOMATED COUNT: 14.1 % (ref 11.5–14.5)
GLOBULIN SER-MCNC: 3.5 GM/ML (ref 2.3–3.5)
GLUCOSE SERPL-MCNC: 97 MG/DL (ref 74–106)
HCT VFR BLD AUTO: 44.9 % (ref 40–51)
HGB BLD-MCNC: 14.8 GM/DL (ref 13.5–17.5)
IMM GRANULOCYTES # BLD AUTO: 0.01 10*3/UL
IMM GRANULOCYTES NFR BLD AUTO: 0 %
LYMPHOCYTES # BLD AUTO: 1.7 X10(3)/MCL (ref 0.6–4.6)
LYMPHOCYTES # BLD AUTO: 1846 UNIT/L (ref 1260–5520)
LYMPHOCYTES NFR BLD AUTO: 29 %
LYMPHOCYTES NFR LN MANUAL: 26 % (ref 28–48)
LYMPHOMA - T-CELL MARKERS SPEC-IMP: ABNORMAL
MCH RBC QN AUTO: 32.4 PG (ref 26–34)
MCHC RBC AUTO-ENTMCNC: 33 GM/DL (ref 31–37)
MCV RBC AUTO: 98.2 FL (ref 80–100)
MONOCYTES # BLD AUTO: 0.8 X10(3)/MCL (ref 0.1–1.3)
MONOCYTES NFR BLD AUTO: 14 %
NEUTROPHILS # BLD AUTO: 3.22 X10(3)/MCL (ref 2.1–9.2)
NEUTROPHILS NFR BLD AUTO: 55 %
PLATELET # BLD AUTO: 208 X10(3)/MCL (ref 130–400)
PMV BLD AUTO: 9.7 FL (ref 7.4–10.4)
POTASSIUM SERPL-SCNC: 4.6 MMOL/L (ref 3.5–5.1)
PROT SERPL-MCNC: 7.5 GM/DL (ref 6.4–8.2)
RBC # BLD AUTO: 4.57 X10(6)/MCL (ref 4.5–5.9)
SODIUM SERPL-SCNC: 140 MMOL/L (ref 136–145)
WBC # BLD AUTO: 7100 /MM3 (ref 4500–11500)
WBC # SPEC AUTO: 5.8 X10(3)/MCL (ref 4.5–11)

## 2020-11-14 ENCOUNTER — HISTORICAL (OUTPATIENT)
Dept: ADMINISTRATIVE | Facility: HOSPITAL | Age: 30
End: 2020-11-14

## 2020-11-14 LAB
FLUAV AG UPPER RESP QL IA.RAPID: NEGATIVE
FLUBV AG UPPER RESP QL IA.RAPID: NEGATIVE

## 2021-02-01 ENCOUNTER — HISTORICAL (OUTPATIENT)
Dept: ADMINISTRATIVE | Facility: HOSPITAL | Age: 31
End: 2021-02-01

## 2021-02-01 LAB
ABS NEUT (OLG): 2.71 X10(3)/MCL (ref 2.1–9.2)
ALBUMIN SERPL-MCNC: 4.8 GM/DL (ref 3.5–5)
ALBUMIN/GLOB SERPL: 1.6 RATIO (ref 1.1–2)
ALP SERPL-CCNC: 38 UNIT/L (ref 40–150)
ALT SERPL-CCNC: 21 UNIT/L (ref 0–55)
AST SERPL-CCNC: 33 UNIT/L (ref 5–34)
BASOPHILS # BLD AUTO: 0 X10(3)/MCL (ref 0–0.2)
BASOPHILS NFR BLD AUTO: 1 %
BILIRUB SERPL-MCNC: 0.8 MG/DL
BILIRUBIN DIRECT+TOT PNL SERPL-MCNC: 0.3 MG/DL (ref 0–0.5)
BILIRUBIN DIRECT+TOT PNL SERPL-MCNC: 0.5 MG/DL (ref 0–0.8)
BUN SERPL-MCNC: 15 MG/DL (ref 8.9–20.6)
CALCIUM SERPL-MCNC: 9.6 MG/DL (ref 8.4–10.2)
CD3+CD4+ CELLS # SPEC: 696 UNIT/L (ref 589–1505)
CD3+CD4+ CELLS NFR BLD: 48 % (ref 31–59)
CHLORIDE SERPL-SCNC: 104 MMOL/L (ref 98–107)
CO2 SERPL-SCNC: 27 MMOL/L (ref 22–29)
CREAT SERPL-MCNC: 0.9 MG/DL (ref 0.73–1.18)
EOSINOPHIL # BLD AUTO: 0.1 X10(3)/MCL (ref 0–0.9)
EOSINOPHIL NFR BLD AUTO: 1 %
ERYTHROCYTE [DISTWIDTH] IN BLOOD BY AUTOMATED COUNT: 13.4 % (ref 11.5–14.5)
GLOBULIN SER-MCNC: 3 GM/DL (ref 2.4–3.5)
GLUCOSE SERPL-MCNC: 88 MG/DL (ref 74–100)
HCT VFR BLD AUTO: 44.3 % (ref 40–51)
HGB BLD-MCNC: 15.2 GM/DL (ref 13.5–17.5)
IMM GRANULOCYTES # BLD AUTO: 0.02 10*3/UL
IMM GRANULOCYTES NFR BLD AUTO: 0 %
LYMPHOCYTES # BLD AUTO: 1.4 X10(3)/MCL (ref 0.6–4.6)
LYMPHOCYTES # BLD AUTO: 1450 UNIT/L (ref 1260–5520)
LYMPHOCYTES NFR BLD AUTO: 29 %
LYMPHOCYTES NFR LN MANUAL: 29 % (ref 28–48)
LYMPHOMA - T-CELL MARKERS SPEC-IMP: NORMAL
MCH RBC QN AUTO: 33 PG (ref 26–34)
MCHC RBC AUTO-ENTMCNC: 34.3 GM/DL (ref 31–37)
MCV RBC AUTO: 96.3 FL (ref 80–100)
MONOCYTES # BLD AUTO: 0.8 X10(3)/MCL (ref 0.1–1.3)
MONOCYTES NFR BLD AUTO: 15 %
NEUTROPHILS # BLD AUTO: 2.71 X10(3)/MCL (ref 2.1–9.2)
NEUTROPHILS NFR BLD AUTO: 54 %
PLATELET # BLD AUTO: 209 X10(3)/MCL (ref 130–400)
PMV BLD AUTO: 10.1 FL (ref 7.4–10.4)
POTASSIUM SERPL-SCNC: 4.2 MMOL/L (ref 3.5–5.1)
PROT SERPL-MCNC: 7.8 GM/DL (ref 6.4–8.3)
RBC # BLD AUTO: 4.6 X10(6)/MCL (ref 4.5–5.9)
RPR SER QL: REACTIVE
SODIUM SERPL-SCNC: 140 MMOL/L (ref 136–145)
T PALLIDUM AB SER QL: REACTIVE
WBC # BLD AUTO: 5000 /MM3 (ref 4500–11500)
WBC # SPEC AUTO: 5 X10(3)/MCL (ref 4.5–11)

## 2021-06-02 ENCOUNTER — HISTORICAL (OUTPATIENT)
Dept: ADMINISTRATIVE | Facility: HOSPITAL | Age: 31
End: 2021-06-02

## 2021-06-02 LAB
ABS NEUT (OLG): 2.77 X10(3)/MCL (ref 2.1–9.2)
ALBUMIN SERPL-MCNC: 4.1 GM/DL (ref 3.5–5)
ALBUMIN/GLOB SERPL: 1.5 RATIO (ref 1.1–2)
ALP SERPL-CCNC: 39 UNIT/L (ref 40–150)
ALT SERPL-CCNC: 28 UNIT/L (ref 0–55)
APPEARANCE, UA: CLEAR
AST SERPL-CCNC: 24 UNIT/L (ref 5–34)
BACTERIA #/AREA URNS AUTO: ABNORMAL /HPF
BASOPHILS # BLD AUTO: 0 X10(3)/MCL (ref 0–0.2)
BASOPHILS NFR BLD AUTO: 1 %
BILIRUB SERPL-MCNC: 0.2 MG/DL
BILIRUB UR QL STRIP: NEGATIVE
BILIRUBIN DIRECT+TOT PNL SERPL-MCNC: 0.1 MG/DL (ref 0–0.5)
BILIRUBIN DIRECT+TOT PNL SERPL-MCNC: 0.1 MG/DL (ref 0–0.8)
BUN SERPL-MCNC: 20.7 MG/DL (ref 8.9–20.6)
CALCIUM SERPL-MCNC: 9.1 MG/DL (ref 8.4–10.2)
CD3+CD4+ CELLS # SPEC: 822 UNIT/L (ref 589–1505)
CD3+CD4+ CELLS NFR BLD: 47.9 % (ref 31–59)
CHLORIDE SERPL-SCNC: 104 MMOL/L (ref 98–107)
CHOLEST SERPL-MCNC: 130 MG/DL
CHOLEST/HDLC SERPL: 3 {RATIO} (ref 0–5)
CO2 SERPL-SCNC: 27 MMOL/L (ref 22–29)
COLOR UR: NORMAL
CREAT SERPL-MCNC: 0.8 MG/DL (ref 0.73–1.18)
DEPRECATED CALCIDIOL+CALCIFEROL SERPL-MC: 65.3 NG/ML (ref 30–80)
EOSINOPHIL # BLD AUTO: 0 X10(3)/MCL (ref 0–0.9)
EOSINOPHIL NFR BLD AUTO: 0 %
ERYTHROCYTE [DISTWIDTH] IN BLOOD BY AUTOMATED COUNT: 13.4 % (ref 11.5–14.5)
EST. AVERAGE GLUCOSE BLD GHB EST-MCNC: 105.4 MG/DL
GLOBULIN SER-MCNC: 2.7 GM/DL (ref 2.4–3.5)
GLUCOSE (UA): NEGATIVE
GLUCOSE SERPL-MCNC: 86 MG/DL (ref 74–100)
HBA1C MFR BLD: 5.3 %
HCT VFR BLD AUTO: 40.2 % (ref 40–51)
HCV AB SERPL QL IA: NONREACTIVE
HDLC SERPL-MCNC: 47 MG/DL (ref 35–60)
HGB BLD-MCNC: 13.2 GM/DL (ref 13.5–17.5)
HGB UR QL STRIP: NEGATIVE
HYALINE CASTS #/AREA URNS LPF: ABNORMAL /LPF
IMM GRANULOCYTES # BLD AUTO: 0.01 10*3/UL
IMM GRANULOCYTES NFR BLD AUTO: 0 %
KETONES UR QL STRIP: NEGATIVE
LDLC SERPL CALC-MCNC: 72 MG/DL (ref 50–140)
LEUKOCYTE ESTERASE UR QL STRIP: NEGATIVE
LYMPHOCYTES # BLD AUTO: 1.7 X10(3)/MCL (ref 0.6–4.6)
LYMPHOCYTES # BLD AUTO: 1716 UNIT/L (ref 1260–5520)
LYMPHOCYTES NFR BLD AUTO: 33 %
LYMPHOCYTES NFR LN MANUAL: 33 % (ref 28–48)
LYMPHOMA - T-CELL MARKERS SPEC-IMP: NORMAL
MCH RBC QN AUTO: 32.8 PG (ref 26–34)
MCHC RBC AUTO-ENTMCNC: 32.8 GM/DL (ref 31–37)
MCV RBC AUTO: 99.8 FL (ref 80–100)
MONOCYTES # BLD AUTO: 0.6 X10(3)/MCL (ref 0.1–1.3)
MONOCYTES NFR BLD AUTO: 12 %
NEG CONT SPOT COUNT: NORMAL
NEUTROPHILS # BLD AUTO: 2.77 X10(3)/MCL (ref 2.1–9.2)
NEUTROPHILS NFR BLD AUTO: 54 %
NITRITE UR QL STRIP: NEGATIVE
NRBC BLD AUTO-RTO: 0 % (ref 0–0.2)
PANEL A SPOT COUNT: 0
PANEL B SPOT COUNT: 0
PH UR STRIP: 5.5 [PH] (ref 4.5–8)
PLATELET # BLD AUTO: 190 X10(3)/MCL (ref 130–400)
PMV BLD AUTO: 9.8 FL (ref 7.4–10.4)
POS CONT SPOT COUNT: NORMAL
POTASSIUM SERPL-SCNC: 3.9 MMOL/L (ref 3.5–5.1)
PROT SERPL-MCNC: 6.8 GM/DL (ref 6.4–8.3)
PROT UR QL STRIP: NEGATIVE
RBC # BLD AUTO: 4.03 X10(6)/MCL (ref 4.5–5.9)
RBC #/AREA URNS AUTO: ABNORMAL /HPF
RPR SER QL: REACTIVE
SODIUM SERPL-SCNC: 137 MMOL/L (ref 136–145)
SP GR UR STRIP: 1.01 (ref 1–1.03)
SQUAMOUS #/AREA URNS LPF: ABNORMAL /LPF
T PALLIDUM AB SER QL: REACTIVE
T-SPOT.TB: NORMAL
TRIGL SERPL-MCNC: 53 MG/DL (ref 34–140)
TSH SERPL-ACNC: 1.46 UIU/ML (ref 0.35–4.94)
UROBILINOGEN UR STRIP-ACNC: NORMAL
VLDLC SERPL CALC-MCNC: 11 MG/DL
WBC # BLD AUTO: 5200 /MM3 (ref 4500–11500)
WBC # SPEC AUTO: 5.2 X10(3)/MCL (ref 4.5–11)
WBC #/AREA URNS AUTO: ABNORMAL /HPF

## 2021-09-20 ENCOUNTER — HISTORICAL (OUTPATIENT)
Dept: ADMINISTRATIVE | Facility: HOSPITAL | Age: 31
End: 2021-09-20

## 2021-09-20 LAB
ABS NEUT (OLG): 3.63 X10(3)/MCL (ref 2.1–9.2)
ALBUMIN SERPL-MCNC: 4.4 GM/DL (ref 3.5–5)
ALBUMIN/GLOB SERPL: 1.5 RATIO (ref 1.1–2)
ALP SERPL-CCNC: 40 UNIT/L (ref 40–150)
ALT SERPL-CCNC: 30 UNIT/L (ref 0–55)
AST SERPL-CCNC: 27 UNIT/L (ref 5–34)
BASOPHILS # BLD AUTO: 0 X10(3)/MCL (ref 0–0.2)
BASOPHILS NFR BLD AUTO: 1 %
BILIRUB SERPL-MCNC: 0.4 MG/DL
BILIRUBIN DIRECT+TOT PNL SERPL-MCNC: 0.2 MG/DL (ref 0–0.5)
BILIRUBIN DIRECT+TOT PNL SERPL-MCNC: 0.2 MG/DL (ref 0–0.8)
BUN SERPL-MCNC: 17.9 MG/DL (ref 8.9–20.6)
CALCIUM SERPL-MCNC: 9.6 MG/DL (ref 8.4–10.2)
CD3+CD4+ CELLS # SPEC: 634 UNIT/L (ref 589–1505)
CD3+CD4+ CELLS NFR BLD: 41.6 % (ref 31–59)
CHLORIDE SERPL-SCNC: 104 MMOL/L (ref 98–107)
CO2 SERPL-SCNC: 28 MMOL/L (ref 22–29)
CREAT SERPL-MCNC: 0.81 MG/DL (ref 0.73–1.18)
EOSINOPHIL # BLD AUTO: 0 X10(3)/MCL (ref 0–0.9)
EOSINOPHIL NFR BLD AUTO: 1 %
ERYTHROCYTE [DISTWIDTH] IN BLOOD BY AUTOMATED COUNT: 13.5 % (ref 11.5–14.5)
GLOBULIN SER-MCNC: 2.9 GM/DL (ref 2.4–3.5)
GLUCOSE SERPL-MCNC: 91 MG/DL (ref 74–100)
HCT VFR BLD AUTO: 42.1 % (ref 40–51)
HGB BLD-MCNC: 14.2 GM/DL (ref 13.5–17.5)
IMM GRANULOCYTES # BLD AUTO: 0.01 10*3/UL
IMM GRANULOCYTES NFR BLD AUTO: 0 %
LYMPHOCYTES # BLD AUTO: 1.5 X10(3)/MCL (ref 0.6–4.6)
LYMPHOCYTES # BLD AUTO: 1525 UNIT/L (ref 1260–5520)
LYMPHOCYTES NFR BLD AUTO: 25 %
LYMPHOCYTES NFR LN MANUAL: 25 % (ref 28–48)
LYMPHOMA - T-CELL MARKERS SPEC-IMP: ABNORMAL
MCH RBC QN AUTO: 32.9 PG (ref 26–34)
MCHC RBC AUTO-ENTMCNC: 33.7 GM/DL (ref 31–37)
MCV RBC AUTO: 97.5 FL (ref 80–100)
MONOCYTES # BLD AUTO: 0.8 X10(3)/MCL (ref 0.1–1.3)
MONOCYTES NFR BLD AUTO: 13 %
NEUTROPHILS # BLD AUTO: 3.63 X10(3)/MCL (ref 2.1–9.2)
NEUTROPHILS NFR BLD AUTO: 60 %
NRBC BLD AUTO-RTO: 0 % (ref 0–0.2)
PLATELET # BLD AUTO: 186 X10(3)/MCL (ref 130–400)
PMV BLD AUTO: 10 FL (ref 7.4–10.4)
POTASSIUM SERPL-SCNC: 4.5 MMOL/L (ref 3.5–5.1)
PROT SERPL-MCNC: 7.3 GM/DL (ref 6.4–8.3)
RBC # BLD AUTO: 4.32 X10(6)/MCL (ref 4.5–5.9)
SODIUM SERPL-SCNC: 138 MMOL/L (ref 136–145)
WBC # BLD AUTO: 6100 /MM3 (ref 4500–11500)
WBC # SPEC AUTO: 6.1 X10(3)/MCL (ref 4.5–11)

## 2021-10-08 ENCOUNTER — HISTORICAL (OUTPATIENT)
Dept: ADMINISTRATIVE | Facility: HOSPITAL | Age: 31
End: 2021-10-08

## 2021-10-08 LAB — SARS-COV-2 RNA RESP QL NAA+PROBE: NEGATIVE

## 2021-10-10 LAB — FINAL CULTURE: NORMAL

## 2022-01-04 ENCOUNTER — HISTORICAL (OUTPATIENT)
Dept: ADMINISTRATIVE | Facility: HOSPITAL | Age: 32
End: 2022-01-04

## 2022-01-04 LAB
ABS NEUT (OLG): 4.87 X10(3)/MCL (ref 2.1–9.2)
ALBUMIN SERPL-MCNC: 4.6 GM/DL (ref 3.5–5)
ALBUMIN/GLOB SERPL: 1.6 RATIO (ref 1.1–2)
ALP SERPL-CCNC: 40 UNIT/L (ref 40–150)
ALT SERPL-CCNC: 36 UNIT/L (ref 0–55)
AST SERPL-CCNC: 36 UNIT/L (ref 5–34)
BASOPHILS # BLD AUTO: 0 X10(3)/MCL (ref 0–0.2)
BASOPHILS NFR BLD AUTO: 0 %
BILIRUB SERPL-MCNC: 0.2 MG/DL
BILIRUBIN DIRECT+TOT PNL SERPL-MCNC: 0.1 MG/DL (ref 0–0.5)
BILIRUBIN DIRECT+TOT PNL SERPL-MCNC: 0.1 MG/DL (ref 0–0.8)
BUN SERPL-MCNC: 23.6 MG/DL (ref 8.9–20.6)
CALCIUM SERPL-MCNC: 9.5 MG/DL (ref 8.7–10.5)
CD3+CD4+ CELLS # SPEC: 595 UNIT/L (ref 589–1505)
CD3+CD4+ CELLS NFR BLD: 40.2 % (ref 31–59)
CHLORIDE SERPL-SCNC: 103 MMOL/L (ref 98–107)
CO2 SERPL-SCNC: 29 MMOL/L (ref 22–29)
CREAT SERPL-MCNC: 0.93 MG/DL (ref 0.73–1.18)
EOSINOPHIL # BLD AUTO: 0.2 X10(3)/MCL (ref 0–0.9)
EOSINOPHIL NFR BLD AUTO: 2 %
ERYTHROCYTE [DISTWIDTH] IN BLOOD BY AUTOMATED COUNT: 14.1 % (ref 11.5–14.5)
GLOBULIN SER-MCNC: 2.9 GM/DL (ref 2.4–3.5)
GLUCOSE SERPL-MCNC: 94 MG/DL (ref 74–100)
HCT VFR BLD AUTO: 42.3 % (ref 40–51)
HCV AB SERPL QL IA: NONREACTIVE
HGB BLD-MCNC: 14 GM/DL (ref 13.5–17.5)
IMM GRANULOCYTES # BLD AUTO: 0.03 10*3/UL
IMM GRANULOCYTES NFR BLD AUTO: 0 %
LYMPHOCYTES # BLD AUTO: 1.5 X10(3)/MCL (ref 0.6–4.6)
LYMPHOCYTES # BLD AUTO: 1480 UNIT/L (ref 1260–5520)
LYMPHOCYTES NFR BLD AUTO: 20 %
LYMPHOCYTES NFR LN MANUAL: 20 % (ref 28–48)
LYMPHOMA - T-CELL MARKERS SPEC-IMP: ABNORMAL
MCH RBC QN AUTO: 33.3 PG (ref 26–34)
MCHC RBC AUTO-ENTMCNC: 33.1 GM/DL (ref 31–37)
MCV RBC AUTO: 100.5 FL (ref 80–100)
MONOCYTES # BLD AUTO: 0.8 X10(3)/MCL (ref 0.1–1.3)
MONOCYTES NFR BLD AUTO: 11 %
NEUTROPHILS # BLD AUTO: 4.87 X10(3)/MCL (ref 2.1–9.2)
NEUTROPHILS NFR BLD AUTO: 66 %
NRBC BLD AUTO-RTO: 0 % (ref 0–0.2)
PLATELET # BLD AUTO: 223 X10(3)/MCL (ref 130–400)
PMV BLD AUTO: 9.8 FL (ref 7.4–10.4)
POTASSIUM SERPL-SCNC: 4.5 MMOL/L (ref 3.5–5.1)
PROT SERPL-MCNC: 7.5 GM/DL (ref 6.4–8.3)
RBC # BLD AUTO: 4.21 X10(6)/MCL (ref 4.5–5.9)
RPR SER QL: REACTIVE
SODIUM SERPL-SCNC: 137 MMOL/L (ref 136–145)
T PALLIDUM AB SER QL: REACTIVE
WBC # BLD AUTO: 7400 /MM3 (ref 4500–11500)
WBC # SPEC AUTO: 7.4 X10(3)/MCL (ref 4.5–11)

## 2022-04-11 ENCOUNTER — HISTORICAL (OUTPATIENT)
Dept: ADMINISTRATIVE | Facility: HOSPITAL | Age: 32
End: 2022-04-11
Payer: MEDICAID

## 2022-04-25 VITALS
DIASTOLIC BLOOD PRESSURE: 71 MMHG | OXYGEN SATURATION: 97 % | WEIGHT: 160.94 LBS | BODY MASS INDEX: 21.8 KG/M2 | HEIGHT: 72 IN | SYSTOLIC BLOOD PRESSURE: 116 MMHG

## 2022-05-10 ENCOUNTER — OFFICE VISIT (OUTPATIENT)
Dept: INFECTIOUS DISEASES | Facility: CLINIC | Age: 32
End: 2022-05-10
Payer: MEDICAID

## 2022-05-10 VITALS
RESPIRATION RATE: 18 BRPM | HEART RATE: 74 BPM | HEIGHT: 71 IN | SYSTOLIC BLOOD PRESSURE: 107 MMHG | BODY MASS INDEX: 24.06 KG/M2 | TEMPERATURE: 98 F | WEIGHT: 171.88 LBS | DIASTOLIC BLOOD PRESSURE: 66 MMHG

## 2022-05-10 DIAGNOSIS — R85.610 PAP SMEAR OF ANUS WITH ASCUS: ICD-10-CM

## 2022-05-10 DIAGNOSIS — F17.200 NICOTINE DEPENDENCE DUE TO VAPING NON-TOBACCO PRODUCT: ICD-10-CM

## 2022-05-10 DIAGNOSIS — B20 HIV DISEASE: Primary | ICD-10-CM

## 2022-05-10 DIAGNOSIS — Z86.19 HISTORY OF SYPHILIS: ICD-10-CM

## 2022-05-10 PROBLEM — T78.40XA ALLERGIES: Status: ACTIVE | Noted: 2022-05-10

## 2022-05-10 LAB
ALBUMIN SERPL-MCNC: 4.6 GM/DL (ref 3.5–5)
ALBUMIN/GLOB SERPL: 1.4 RATIO (ref 1.1–2)
ALP SERPL-CCNC: 35 UNIT/L (ref 40–150)
ALT SERPL-CCNC: 30 UNIT/L (ref 0–55)
AST SERPL-CCNC: 31 UNIT/L (ref 5–34)
BASOPHILS # BLD AUTO: 0.04 X10(3)/MCL (ref 0–0.2)
BASOPHILS NFR BLD AUTO: 0.8 %
BILIRUBIN DIRECT+TOT PNL SERPL-MCNC: 0.3 MG/DL (ref 0–0.5)
BILIRUBIN DIRECT+TOT PNL SERPL-MCNC: 0.3 MG/DL (ref 0–0.8)
BILIRUBIN DIRECT+TOT PNL SERPL-MCNC: 0.6 MG/DL
BUN SERPL-MCNC: 19.4 MG/DL (ref 8.9–20.6)
CALCIUM SERPL-MCNC: 9.9 MG/DL (ref 8.4–10.2)
CHLORIDE SERPL-SCNC: 103 MMOL/L (ref 98–107)
CO2 SERPL-SCNC: 29 MMOL/L (ref 22–29)
CREAT SERPL-MCNC: 0.9 MG/DL (ref 0.73–1.18)
EOSINOPHIL # BLD AUTO: 0 X10(3)/MCL (ref 0–0.9)
EOSINOPHIL NFR BLD AUTO: 0 %
ERYTHROCYTE [DISTWIDTH] IN BLOOD BY AUTOMATED COUNT: 13.2 % (ref 11.5–17)
GLOBULIN SER-MCNC: 3.3 GM/DL (ref 2.4–3.5)
GLUCOSE SERPL-MCNC: 83 MG/DL (ref 74–100)
HCT VFR BLD AUTO: 43.9 % (ref 42–52)
HGB BLD-MCNC: 14.6 GM/DL (ref 14–18)
IMM GRANULOCYTES # BLD AUTO: 0.01 X10(3)/MCL (ref 0–0.02)
IMM GRANULOCYTES NFR BLD AUTO: 0.2 % (ref 0–0.43)
LYMPHOCYTES # BLD AUTO: 1.71 X10(3)/MCL (ref 0.6–4.6)
LYMPHOCYTES NFR BLD AUTO: 36.1 %
MCH RBC QN AUTO: 32.4 PG (ref 27–31)
MCHC RBC AUTO-ENTMCNC: 33.3 MG/DL (ref 33–36)
MCV RBC AUTO: 97.6 FL (ref 80–94)
MONOCYTES # BLD AUTO: 0.78 X10(3)/MCL (ref 0.1–1.3)
MONOCYTES NFR BLD AUTO: 16.5 %
NEUTROPHILS # BLD AUTO: 2.2 X10(3)/MCL (ref 2.1–9.2)
NEUTROPHILS NFR BLD AUTO: 46.4 %
NRBC BLD AUTO-RTO: 0 %
PLATELET # BLD AUTO: 207 X10(3)/MCL (ref 130–400)
PMV BLD AUTO: 10.7 FL (ref 9.4–12.4)
POTASSIUM SERPL-SCNC: 4.3 MMOL/L (ref 3.5–5.1)
PROT SERPL-MCNC: 7.9 GM/DL (ref 6.4–8.3)
RBC # BLD AUTO: 4.5 X10(6)/MCL (ref 4.7–6.1)
SODIUM SERPL-SCNC: 137 MMOL/L (ref 136–145)
T PALLIDUM AB SER QL: ABNORMAL
T PALLIDUM AB SER QL: REACTIVE
WBC # SPEC AUTO: 4.7 X10(3)/MCL (ref 4.5–11.5)

## 2022-05-10 PROCEDURE — 1160F PR REVIEW ALL MEDS BY PRESCRIBER/CLIN PHARMACIST DOCUMENTED: ICD-10-PCS | Mod: CPTII,,, | Performed by: NURSE PRACTITIONER

## 2022-05-10 PROCEDURE — 3008F BODY MASS INDEX DOCD: CPT | Mod: CPTII,,, | Performed by: NURSE PRACTITIONER

## 2022-05-10 PROCEDURE — 1159F PR MEDICATION LIST DOCUMENTED IN MEDICAL RECORD: ICD-10-PCS | Mod: CPTII,,, | Performed by: NURSE PRACTITIONER

## 2022-05-10 PROCEDURE — 3008F PR BODY MASS INDEX (BMI) DOCUMENTED: ICD-10-PCS | Mod: CPTII,,, | Performed by: NURSE PRACTITIONER

## 2022-05-10 PROCEDURE — 86592 SYPHILIS TEST NON-TREP QUAL: CPT | Performed by: NURSE PRACTITIONER

## 2022-05-10 PROCEDURE — 30000890 GENERAL MISCELLANEOUS TEST (BEAKER): Performed by: NURSE PRACTITIONER

## 2022-05-10 PROCEDURE — 86481 TB AG RESPONSE T-CELL SUSP: CPT | Performed by: NURSE PRACTITIONER

## 2022-05-10 PROCEDURE — 3074F PR MOST RECENT SYSTOLIC BLOOD PRESSURE < 130 MM HG: ICD-10-PCS | Mod: CPTII,,, | Performed by: NURSE PRACTITIONER

## 2022-05-10 PROCEDURE — 86361 T CELL ABSOLUTE COUNT: CPT | Performed by: NURSE PRACTITIONER

## 2022-05-10 PROCEDURE — 3078F PR MOST RECENT DIASTOLIC BLOOD PRESSURE < 80 MM HG: ICD-10-PCS | Mod: CPTII,,, | Performed by: NURSE PRACTITIONER

## 2022-05-10 PROCEDURE — 1159F MED LIST DOCD IN RCRD: CPT | Mod: CPTII,,, | Performed by: NURSE PRACTITIONER

## 2022-05-10 PROCEDURE — 36415 COLL VENOUS BLD VENIPUNCTURE: CPT | Performed by: NURSE PRACTITIONER

## 2022-05-10 PROCEDURE — 99214 OFFICE O/P EST MOD 30 MIN: CPT | Mod: S$PBB,,, | Performed by: NURSE PRACTITIONER

## 2022-05-10 PROCEDURE — 80053 COMPREHEN METABOLIC PANEL: CPT | Performed by: NURSE PRACTITIONER

## 2022-05-10 PROCEDURE — 86780 TREPONEMA PALLIDUM: CPT | Performed by: NURSE PRACTITIONER

## 2022-05-10 PROCEDURE — 30000890 MISC QUEST TESTING: Performed by: NURSE PRACTITIONER

## 2022-05-10 PROCEDURE — 3074F SYST BP LT 130 MM HG: CPT | Mod: CPTII,,, | Performed by: NURSE PRACTITIONER

## 2022-05-10 PROCEDURE — 3078F DIAST BP <80 MM HG: CPT | Mod: CPTII,,, | Performed by: NURSE PRACTITIONER

## 2022-05-10 PROCEDURE — 85025 COMPLETE CBC W/AUTO DIFF WBC: CPT | Performed by: NURSE PRACTITIONER

## 2022-05-10 PROCEDURE — 99214 PR OFFICE/OUTPT VISIT, EST, LEVL IV, 30-39 MIN: ICD-10-PCS | Mod: S$PBB,,, | Performed by: NURSE PRACTITIONER

## 2022-05-10 PROCEDURE — 1160F RVW MEDS BY RX/DR IN RCRD: CPT | Mod: CPTII,,, | Performed by: NURSE PRACTITIONER

## 2022-05-10 PROCEDURE — 99214 OFFICE O/P EST MOD 30 MIN: CPT | Mod: PBBFAC | Performed by: NURSE PRACTITIONER

## 2022-05-10 PROCEDURE — 87536 HIV-1 QUANT&REVRSE TRNSCRPJ: CPT | Performed by: NURSE PRACTITIONER

## 2022-05-10 RX ORDER — ELVITEGRAVIR, COBICISTAT, EMTRICITABINE, AND TENOFOVIR ALAFENAMIDE 150; 150; 200; 10 MG/1; MG/1; MG/1; MG/1
1 TABLET ORAL DAILY
COMMUNITY
Start: 2022-01-04 | End: 2022-06-17 | Stop reason: SDUPTHER

## 2022-05-10 RX ORDER — ELVITEGRAVIR, COBICISTAT, EMTRICITABINE, AND TENOFOVIR ALAFENAMIDE 150; 150; 200; 10 MG/1; MG/1; MG/1; MG/1
1 TABLET ORAL DAILY
Qty: 30 TABLET | Refills: 4 | Status: SHIPPED | OUTPATIENT
Start: 2022-05-10 | End: 2022-06-17 | Stop reason: SDUPTHER

## 2022-05-10 RX ORDER — LORATADINE 10 MG/1
TABLET ORAL
COMMUNITY
Start: 2022-05-08 | End: 2022-06-17 | Stop reason: SDUPTHER

## 2022-05-10 RX ORDER — ELVITEGRAVIR, COBICISTAT, EMTRICITABINE, AND TENOFOVIR ALAFENAMIDE 150; 150; 200; 10 MG/1; MG/1; MG/1; MG/1
1 TABLET ORAL DAILY
COMMUNITY
Start: 2022-05-03 | End: 2022-05-10

## 2022-05-10 NOTE — PROGRESS NOTES
Subjective:       Patient ID: Jacob Gee is a 31 y.o. male.    Chief Complaint: b20 f/u     Jacob is a 30 yo WM presenting today for HIV f/u visit.  He reports 100% adherent to Genvoya, tolerates well with viral suppression.  Last labs 1/4/22 HIV UD, CD4 595/40.2%.  Serofast syphilis at 4 dils 1/22.  STI screenings negative 1/22.  Denies any unprotected sexual encounters since last visit, declines need for STI screening swabs at this juncture. History of anal ASCUS, last pap 2/2021 NIL.  Will repeat anal pap next visit as requested.  Last ophth exam 6/2021.  He is still vaping 5% nicotine product, not ready to quit.  All questions answered & concerns addressed.     1/4/22  Jacob is a 30 yo HIV + WM evaluated by audio-video telemedicine.  He is located in his personal vehicle, and I, the provider, am located at [facility].  We are both located in [place], the [facility] in which I am licensed to practice.  The patient consents to telemedicine visit and is the only individual online.  The patient (or patient's representative) stated that they understood and accepted the privacy and security risks to their information at their location.   He reports 100% adherent to Genvoya & tolerates well with viral suppression.  Last labs 9/20/21 VL <20, CD4 634.  He will come to clinic today for repeat labs.  He appreciates full STI screening as well.  Last syphilis titer 6/2021 2 dils, will repeat today.  He will self-collect anal swab for ct/gc in clinic today.  He tells me that he is still vaping 5% nicotine, has not started to wean down as of yet but does remain clean.  He requests refills on Claritin today, [facility].  He remains in care with PCP CEM Tinajero NP.  Today, he also reports a single, small, red, non-draining, tender spot near umbilicus that has been present a couple of days.  He tells me that it looks very much like an ingrown hair which would be consistent for the region.  Will treat with  topical antimicrobial to minimize risk of worsening, voiced appreciation.  All questions answered & concerns addressed.  Face to face time spent with patient exceeds 15 minutes, over 50% of which was used for education and counseling regarding medical conditions, current medications including risk/benefit and side effects/adverse events, over the counter medications-uses/doses, home self-care and contact precautions, and red flags and indications for immediate medical attention.  The patient is receptive, expresses understanding and is agreeable to plan. All questions answered.     9/20/21  Jacob is a 32 yo WM presenting today for HIV f/u visit, dx 2015.  He reports 100% adherent to Genvoya, tolerates well with viral suppression.  Last labs 6/21 VL <20, Cd4 882.  He has a history of syphilis, serofast at 0-4 dils.  Denies any sexual encounters since last visit. Denies need for repeat oral or anal ct/gc screenings at this juncture.  He has been clean & sober 1 yr and 7 months now, longest he has stayed clean since starting drug use.  He has established care with PCP CEM Tinajero NP.  Attended 1st visit 6/2021.  He also attended eye exam at [facility] 6/2021 & is now wearing eyeglasses for correction.  He continues otc vitamin d3, levels stable.  Immunizations are UTD.  He continues to vape nicotine containing substance, encouraged to stop but is not quite ready.  Trying to wean down nicotine intermittently.  No concerns voiced today.      Review of Systems   Constitutional: Negative.    HENT: Negative.    Respiratory: Negative.    Cardiovascular: Negative.    Gastrointestinal: Negative.    Genitourinary: Negative.    Integumentary:  Negative.   Neurological: Negative.    Hematological: Negative.    Psychiatric/Behavioral: Negative.          Objective:      Physical Exam  Vitals reviewed.   Constitutional:       General: He is not in acute distress.     Appearance: Normal appearance. He is not toxic-appearing.   HENT:       Mouth/Throat:      Mouth: Mucous membranes are moist.      Pharynx: Oropharynx is clear.   Eyes:      Conjunctiva/sclera: Conjunctivae normal.   Cardiovascular:      Rate and Rhythm: Normal rate and regular rhythm.   Pulmonary:      Effort: Pulmonary effort is normal. No respiratory distress.      Breath sounds: Normal breath sounds.   Abdominal:      General: Abdomen is flat. Bowel sounds are normal.      Palpations: Abdomen is soft.   Musculoskeletal:         General: Normal range of motion.      Cervical back: Normal range of motion.   Lymphadenopathy:      Cervical: No cervical adenopathy.   Skin:     General: Skin is warm and dry.   Neurological:      General: No focal deficit present.      Mental Status: He is alert and oriented to person, place, and time. Mental status is at baseline.   Psychiatric:         Mood and Affect: Mood normal.         Behavior: Behavior normal.         Assessment:       Problem List Items Addressed This Visit        Renal/    Pap smear of anus with ASCUS       ID    HIV disease - Primary    History of syphilis       Other    Nicotine dependence due to vaping non-tobacco product          Plan:       HIV disease  Adherence and sexual health counseling done  Continue Genvoya 1 po daily   Labs today   RTC 4 months with Margaux   -     GENVOYA [phone] mg Tab; Take 1 tablet by mouth once daily.  Dispense: 30 tablet; Refill: 4  -     Comprehensive Metabolic Panel  -     CBC Auto Differential; Future; Expected date: 05/10/2022  -     CD4 Lymphocytes; Future; Expected date: 05/10/2022  -     HIV-1 RNA, Quantitative, PCR with Reflex to Genotype; Future; Expected date: 05/10/2022  -     Miscellaneous Test, Sendout Tspot; Future; Expected date: 05/10/2022    History of syphilis  Serofast at 4 dils 1/2022  Repeat titer today   HX:  treated with Bicillin 2.4 mil units IM 10/2017 for titer 64 dils  retreated with Bicillin 7.2 mil units IM 8/31/18-9/14/2018  retreated with Bicillin 2.4 mil  units IM 2/2019    -     SYPHILIS ANTIBODY (WITH REFLEX RPR); Future; Expected date: 05/10/2022    Nicotine dependence due to vaping non-tobacco product  Cessation encouraged, not ready to quit    Pap smear of anus with ASCUS  Repeat anal pap next visit     HIV Wellness:  anal pap 12/28/16 ASCUS, 6/6/17 NIL, 5/18/18 ASCUS, 2/14/19 ASCUS, 8/19/19 ASCUS, 2/1/21 NIL  anal ct/gc 12/28/16 neg, 6/6/17 +/+ treated, 2/14/18 neg, 5/18/18 neg, 2/14/19 + gonorrhea, 8/19 neg, 2/1/21 neg, 1/4/22  oral ct/gc 12/28/16 neg, 6/6/17 + treated, 2/14/18 neg, 5/18/18 neg, 2/14/19 neg, 8/19 neg, 2/1/21 neg, 1/4/22  urine ct/gc 12/28/16 neg, 6/6/17 neg, 2/14/18 neg, 5/18/18 neg, 2/14/19 neg, 8/19 neg, 2/1/21 neg, 6/21 neg, 1/4/22  ophth 4/15, 1/20, 6/21

## 2022-05-11 LAB
AGE: 31
CD3+CD4+ CELLS # BLD: 36 % (ref 28–48)
CD3+CD4+ CELLS # SPEC: 693.72 UNIT/L (ref 589–1505)
CD3+CD4+ CELLS NFR BLD: 41 %
LYMPHOCYTES # BLD AUTO: 1692 X10(3)/MCL (ref 1260–5520)
LYMPHOMA - T-CELL MARKERS SPEC-IMP: NORMAL
WBC # BLD AUTO: 4700 /MM3 (ref 4500–11500)

## 2022-05-12 LAB — HIV1 RNA # PLAS NAA DL=20: NORMAL COPIES/ML

## 2022-05-16 LAB — BEAKER SEE SCANNED REPORT: NORMAL

## 2022-05-20 LAB
RPR SER QL: ABNORMAL
RPR SER QL: REACTIVE
RPR SER-TITR: ABNORMAL {TITER}

## 2022-05-24 DIAGNOSIS — A53.9 SYPHILIS: Primary | ICD-10-CM

## 2022-05-26 ENCOUNTER — CLINICAL SUPPORT (OUTPATIENT)
Dept: INFECTIOUS DISEASES | Facility: CLINIC | Age: 32
End: 2022-05-26
Payer: MEDICAID

## 2022-05-26 DIAGNOSIS — A53.9 SYPHILIS: Primary | ICD-10-CM

## 2022-05-26 PROCEDURE — 99211 OFF/OP EST MAY X REQ PHY/QHP: CPT | Mod: PBBFAC

## 2022-05-26 PROCEDURE — 96372 THER/PROPH/DIAG INJ SC/IM: CPT | Mod: PBBFAC

## 2022-05-26 RX ADMIN — PENICILLIN G BENZATHINE 2.4 MILLION UNITS: 1200000 INJECTION, SUSPENSION INTRAMUSCULAR at 02:05

## 2022-06-02 ENCOUNTER — CLINICAL SUPPORT (OUTPATIENT)
Dept: INFECTIOUS DISEASES | Facility: CLINIC | Age: 32
End: 2022-06-02
Payer: MEDICAID

## 2022-06-02 DIAGNOSIS — A53.9 SYPHILIS: Primary | ICD-10-CM

## 2022-06-02 PROCEDURE — 99211 OFF/OP EST MAY X REQ PHY/QHP: CPT | Mod: PBBFAC

## 2022-06-02 PROCEDURE — 96372 THER/PROPH/DIAG INJ SC/IM: CPT | Mod: PBBFAC

## 2022-06-02 RX ADMIN — PENICILLIN G BENZATHINE 2.4 MILLION UNITS: 1200000 INJECTION, SUSPENSION INTRAMUSCULAR at 09:06

## 2022-06-09 ENCOUNTER — CLINICAL SUPPORT (OUTPATIENT)
Dept: INFECTIOUS DISEASES | Facility: CLINIC | Age: 32
End: 2022-06-09
Payer: MEDICAID

## 2022-06-09 PROCEDURE — 96372 THER/PROPH/DIAG INJ SC/IM: CPT | Mod: PBBFAC

## 2022-06-09 PROCEDURE — 99211 OFF/OP EST MAY X REQ PHY/QHP: CPT | Mod: 25,PBBFAC

## 2022-06-09 RX ADMIN — PENICILLIN G BENZATHINE 2.4 MILLION UNITS: 1200000 INJECTION, SUSPENSION INTRAMUSCULAR at 09:06

## 2022-06-17 ENCOUNTER — LAB VISIT (OUTPATIENT)
Dept: LAB | Facility: HOSPITAL | Age: 32
End: 2022-06-17
Attending: NURSE PRACTITIONER
Payer: MEDICAID

## 2022-06-17 ENCOUNTER — OFFICE VISIT (OUTPATIENT)
Dept: INTERNAL MEDICINE | Facility: CLINIC | Age: 32
End: 2022-06-17
Payer: MEDICAID

## 2022-06-17 VITALS
HEIGHT: 71 IN | RESPIRATION RATE: 18 BRPM | TEMPERATURE: 98 F | WEIGHT: 169.06 LBS | BODY MASS INDEX: 23.67 KG/M2 | HEART RATE: 66 BPM | SYSTOLIC BLOOD PRESSURE: 117 MMHG | DIASTOLIC BLOOD PRESSURE: 68 MMHG

## 2022-06-17 DIAGNOSIS — Z00.00 WELLNESS EXAMINATION: ICD-10-CM

## 2022-06-17 DIAGNOSIS — Z13.220 SCREENING CHOLESTEROL LEVEL: ICD-10-CM

## 2022-06-17 DIAGNOSIS — Z13.1 SCREENING FOR DIABETES MELLITUS: ICD-10-CM

## 2022-06-17 DIAGNOSIS — B20 HIV DISEASE: ICD-10-CM

## 2022-06-17 DIAGNOSIS — F19.982 DRUG-INDUCED INSOMNIA: ICD-10-CM

## 2022-06-17 DIAGNOSIS — Z86.19 HISTORY OF SYPHILIS: ICD-10-CM

## 2022-06-17 DIAGNOSIS — J30.89 NON-SEASONAL ALLERGIC RHINITIS, UNSPECIFIED TRIGGER: ICD-10-CM

## 2022-06-17 DIAGNOSIS — Z13.29 SCREENING FOR THYROID DISORDER: ICD-10-CM

## 2022-06-17 DIAGNOSIS — E55.9 VITAMIN D DEFICIENCY: ICD-10-CM

## 2022-06-17 DIAGNOSIS — F17.200 NICOTINE DEPENDENCE DUE TO VAPING NON-TOBACCO PRODUCT: Primary | ICD-10-CM

## 2022-06-17 PROBLEM — J30.9 ALLERGIC RHINITIS: Status: ACTIVE | Noted: 2022-06-17

## 2022-06-17 LAB
APPEARANCE UR: CLEAR
BACTERIA #/AREA URNS AUTO: ABNORMAL /HPF
BILIRUB UR QL STRIP.AUTO: NEGATIVE MG/DL
CHOLEST SERPL-MCNC: 158 MG/DL
CHOLEST/HDLC SERPL: 3 {RATIO} (ref 0–5)
COLOR UR AUTO: YELLOW
EST. AVERAGE GLUCOSE BLD GHB EST-MCNC: 99.7 MG/DL
GLUCOSE UR QL STRIP.AUTO: NORMAL MG/DL
HBA1C MFR BLD: 5.1 %
HDLC SERPL-MCNC: 48 MG/DL (ref 35–60)
HYALINE CASTS #/AREA URNS LPF: ABNORMAL /LPF
KETONES UR QL STRIP.AUTO: NEGATIVE MG/DL
LDLC SERPL CALC-MCNC: 95 MG/DL (ref 50–140)
LEUKOCYTE ESTERASE UR QL STRIP.AUTO: NEGATIVE UNIT/L
MUCOUS THREADS URNS QL MICRO: ABNORMAL /LPF
NITRITE UR QL STRIP.AUTO: NEGATIVE
PH UR STRIP.AUTO: 6.5 [PH]
PROT UR QL STRIP.AUTO: NEGATIVE MG/DL
RBC #/AREA URNS AUTO: ABNORMAL /HPF
RBC UR QL AUTO: NEGATIVE UNIT/L
SP GR UR STRIP.AUTO: 1.02
SQUAMOUS #/AREA URNS LPF: ABNORMAL /HPF
TRIGL SERPL-MCNC: 75 MG/DL (ref 34–140)
TSH SERPL-ACNC: 1.72 UIU/ML (ref 0.35–4.94)
UROBILINOGEN UR STRIP-ACNC: NORMAL MG/DL
VLDLC SERPL CALC-MCNC: 15 MG/DL
WBC #/AREA URNS AUTO: ABNORMAL /HPF

## 2022-06-17 PROCEDURE — 81001 URINALYSIS AUTO W/SCOPE: CPT

## 2022-06-17 PROCEDURE — 99214 OFFICE O/P EST MOD 30 MIN: CPT | Mod: PBBFAC | Performed by: NURSE PRACTITIONER

## 2022-06-17 PROCEDURE — 1159F PR MEDICATION LIST DOCUMENTED IN MEDICAL RECORD: ICD-10-PCS | Mod: CPTII,,, | Performed by: NURSE PRACTITIONER

## 2022-06-17 PROCEDURE — 3078F DIAST BP <80 MM HG: CPT | Mod: CPTII,,, | Performed by: NURSE PRACTITIONER

## 2022-06-17 PROCEDURE — 3008F BODY MASS INDEX DOCD: CPT | Mod: CPTII,,, | Performed by: NURSE PRACTITIONER

## 2022-06-17 PROCEDURE — 99213 PR OFFICE/OUTPT VISIT, EST, LEVL III, 20-29 MIN: ICD-10-PCS | Mod: S$PBB,,, | Performed by: NURSE PRACTITIONER

## 2022-06-17 PROCEDURE — 3008F PR BODY MASS INDEX (BMI) DOCUMENTED: ICD-10-PCS | Mod: CPTII,,, | Performed by: NURSE PRACTITIONER

## 2022-06-17 PROCEDURE — 80061 LIPID PANEL: CPT

## 2022-06-17 PROCEDURE — 1160F RVW MEDS BY RX/DR IN RCRD: CPT | Mod: CPTII,,, | Performed by: NURSE PRACTITIONER

## 2022-06-17 PROCEDURE — 3074F SYST BP LT 130 MM HG: CPT | Mod: CPTII,,, | Performed by: NURSE PRACTITIONER

## 2022-06-17 PROCEDURE — 99213 OFFICE O/P EST LOW 20 MIN: CPT | Mod: S$PBB,,, | Performed by: NURSE PRACTITIONER

## 2022-06-17 PROCEDURE — 1160F PR REVIEW ALL MEDS BY PRESCRIBER/CLIN PHARMACIST DOCUMENTED: ICD-10-PCS | Mod: CPTII,,, | Performed by: NURSE PRACTITIONER

## 2022-06-17 PROCEDURE — 84443 ASSAY THYROID STIM HORMONE: CPT

## 2022-06-17 PROCEDURE — 3078F PR MOST RECENT DIASTOLIC BLOOD PRESSURE < 80 MM HG: ICD-10-PCS | Mod: CPTII,,, | Performed by: NURSE PRACTITIONER

## 2022-06-17 PROCEDURE — 3074F PR MOST RECENT SYSTOLIC BLOOD PRESSURE < 130 MM HG: ICD-10-PCS | Mod: CPTII,,, | Performed by: NURSE PRACTITIONER

## 2022-06-17 PROCEDURE — 83036 HEMOGLOBIN GLYCOSYLATED A1C: CPT

## 2022-06-17 PROCEDURE — 1159F MED LIST DOCD IN RCRD: CPT | Mod: CPTII,,, | Performed by: NURSE PRACTITIONER

## 2022-06-17 PROCEDURE — 36415 COLL VENOUS BLD VENIPUNCTURE: CPT

## 2022-06-17 RX ORDER — LORATADINE 10 MG/1
10 TABLET ORAL DAILY
Qty: 90 TABLET | Refills: 3 | Status: SHIPPED | OUTPATIENT
Start: 2022-06-17 | End: 2022-12-05 | Stop reason: SDUPTHER

## 2022-06-17 RX ORDER — AMITRIPTYLINE HYDROCHLORIDE 25 MG/1
25 TABLET, FILM COATED ORAL NIGHTLY PRN
Qty: 30 TABLET | Refills: 1 | Status: SHIPPED | OUTPATIENT
Start: 2022-06-17 | End: 2022-12-05

## 2022-06-17 NOTE — ASSESSMENT & PLAN NOTE
Rx elavil 25 mg qhs prn.  Avoid caffeine, alcohol and stimulants.  Do not use illicit drugs.  Practice positive phrases and repeat throughout the day, yoga, lavender scents or Chamomile tea to promote relaxation.  Set healthy boundaries, avoid people and conversations that increase stress.  Power down electronic devices at least one hour prior to bedtime.  Keep room dark; use eye mask or relaxation sound machine to promote rest.  Sleep hygiene refers to actions that tend to improve and maintain good sleep:  Sleep as long as necessary to feel rested (usually seven to eight hours for adults) and then get out of bed  Maintain a regular sleep schedule, particularly a regular wake-up time in the morning  Avoid smoking or other nicotine intake, particularly during the evening  Avoid daytime naps, especially if they are longer than 20 to 30 minutes or occur late in the day.

## 2022-06-17 NOTE — ASSESSMENT & PLAN NOTE
Continue claritin.  Avoid/limit triggers such as pollen, dust, molds, and pet dander.   Avoid smoke, strong chemicals, cleaning products, perfumes/colognes.

## 2022-06-17 NOTE — ASSESSMENT & PLAN NOTE
Keep ID appts/diagnostics as scheduled.  Take meds as prescribed.  Educated patient on importance of medication adherence and resistance.   Educated on compliance with barrier protection (condoms) during sexual intercourse to prevent other STI's.

## 2022-06-17 NOTE — ASSESSMENT & PLAN NOTE
Eye Exam - Last Eye Exam 6/2021  Dental Exam - Contacted Priscila Martinez; will re-attempt in fall.  Vaccinations: Flu - 10/7/20 / Covid - 3/20/21 & 4/10/21 / Pneumonia - 7/17/15 & 2/1/21 / Shingles - / Tetanus - UTD (9/28/18)  Labwork - ordered; pt is fasting.

## 2022-06-17 NOTE — PROGRESS NOTES
KATIE Brown   [facility]  [address] [address]      PATIENT NAME: Jacob Gee  : 1990  DATE: 22  MRN: [id number]      Billing Provider: KATIE Brown  Level of Service: MS OFFICE/OUTPT VISIT, EST, LEVL III, 20-29 MIN  Patient PCP Information     Provider PCP Type    KATIE Brown General          Reason for Visit / Chief Complaint: Follow-up (Needs refills)       History of Present Illness / Problem Focused Workflow     Jacob Gee is a 32 y.o. White male presents to the clinic for annual visit. PMH HIV, vit d deficiency, and polysubstance abuse (meth, manjula, ecstasy, adderall or vyvanse), syphilis, and tobacco abuse. Followed by [facility], Margaux Hardy NP. Continues to vape with 5% nicotine daily; not ready to quit. Had recent treatment for syphilis. Reports med compliance. Chinmay has been drug free for over 2 yrs. Continues to work at [employer] Chinmay has had trouble staying asleep; will fall asleep without any problems but wakens after about 2 hours. Has tried benadryl; left hangover. Melatonin ineffective and tried trazodone in rehab that made him feel sort of anxious. Will have labs drawn as he is fasting. Denies chest pain, shortness of breath, cough, fever, headache, dizziness, weakness, abdominal pain, nausea, vomiting, diarrhea, constipation, dysuria, depression, anxiety, SI/HI.    Review of Systems     Review of Systems   Constitutional: Negative.    HENT: Negative.    Eyes: Negative.    Respiratory: Negative.    Cardiovascular: Negative.    Gastrointestinal: Negative.    Endocrine: Negative.    Genitourinary: Negative.    Musculoskeletal: Negative.    Integumentary:  Negative.   Neurological: Negative.    Psychiatric/Behavioral: Negative.         Medical / Social / Family History     Past Medical History:   Diagnosis Date    Allergy     HIV infection        History  "reviewed. No pertinent surgical history.    Social History  Mr. Gee  reports that he has been smoking vaping with nicotine. He has never used smokeless tobacco. He reports previous alcohol use. He reports previous drug use.    Family History  Mr. Gee's family history includes No Known Problems in his brother, father, mother, and sister.    Medications and Allergies     Medications  Medication List with Changes/Refills   New Medications    AMITRIPTYLINE (ELAVIL) 25 MG TABLET    Take 1 tablet (25 mg total) by mouth nightly as needed for Insomnia.   Current Medications    ELVITEG-COB-EMTRI-TENOF ALAFEN (GENVOYA) [id number] MG TAB    Take 1 tablet by mouth once daily at 6am.   Changed and/or Refilled Medications    Modified Medication Previous Medication    LORATADINE (CLARITIN) 10 MG TABLET loratadine (CLARITIN) 10 mg tablet       Take 1 tablet (10 mg total) by mouth once daily.       Discontinued Medications    ELVITEG-COB-EMTRI-TENOF ALAFEN (GENVOYA) [phone] MG TAB    Take 1 tablet by mouth once daily.    GENVOYA [id number] MG TAB    Take 1 tablet by mouth once daily.         Allergies  Review of patient's allergies indicates:  No Known Allergies    Physical Examination   Vital Signs  Temp: 98.2 °F (36.8 °C)  Temp src: Oral  Pulse: 66  Resp: 18  BP: 117/68  BP Location: Left arm  Patient Position: Sitting  Height and Weight  Height: 5' 11.26" (181 cm)  Weight: 76.7 kg (169 lb 1.5 oz)  BSA (Calculated - sq m): 1.96 sq meters  BMI (Calculated): 23.4  Weight in (lb) to have BMI = 25: 180.2]   Physical Exam  Vitals reviewed.   Constitutional:       Appearance: Normal appearance.   HENT:      Head: Normocephalic.   Eyes:      Pupils: Pupils are equal, round, and reactive to light.   Cardiovascular:      Rate and Rhythm: Normal rate and regular rhythm.      Pulses: Normal pulses.      Heart sounds: Normal heart sounds.   Pulmonary:      Effort: Pulmonary effort is normal.      Breath sounds: Normal " breath sounds.   Abdominal:      General: Bowel sounds are normal.      Palpations: Abdomen is soft.   Musculoskeletal:         General: Normal range of motion.   Skin:     General: Skin is warm.   Neurological:      Mental Status: He is alert and oriented to person, place, and time.   Psychiatric:         Mood and Affect: Mood normal.           Results     Lab Results   Component Value Date    WBC 4.7 05/10/2022    RBC 4.50 (L) 05/10/2022    HGB 14.6 05/10/2022    HCT 43.9 05/10/2022    MCV 97.6 (H) 05/10/2022    MCH 32.4 (H) 05/10/2022    MCHC 33.3 05/10/2022    RDW 13.2 05/10/2022     05/10/2022    MPV 10.7 05/10/2022     CMP  Sodium Level   Date Value Ref Range Status   05/10/2022 137 136 - 145 mmol/L Final     Potassium Level   Date Value Ref Range Status   05/10/2022 4.3 3.5 - 5.1 mmol/L Final     Carbon Dioxide   Date Value Ref Range Status   05/10/2022 29 22 - 29 mmol/L Final     Blood Urea Nitrogen   Date Value Ref Range Status   05/10/2022 19.4 8.9 - 20.6 mg/dL Final     Creatinine   Date Value Ref Range Status   05/10/2022 0.90 0.73 - 1.18 mg/dL Final     Calcium Level Total   Date Value Ref Range Status   05/10/2022 9.9 8.4 - 10.2 mg/dL Final     Albumin Level   Date Value Ref Range Status   05/10/2022 4.6 3.5 - 5.0 gm/dL Final     Bilirubin Total   Date Value Ref Range Status   05/10/2022 0.6 <=1.5 mg/dL Final     Alkaline Phosphatase   Date Value Ref Range Status   05/10/2022 35 (L) 40 - 150 unit/L Final     Aspartate Aminotransferase   Date Value Ref Range Status   05/10/2022 31 5 - 34 unit/L Final     Alanine Aminotransferase   Date Value Ref Range Status   05/10/2022 30 0 - 55 unit/L Final     Estimated GFR-Non    Date Value Ref Range Status   05/10/2022 >60 mls/min/1.73/m2 Final     Lab Results   Component Value Date    CHOL 130 06/02/2021     Lab Results   Component Value Date    HDL 47 06/02/2021     No results found for: LDLCALC  Lab Results   Component Value Date    TRIG  53 06/02/2021     No results found for: CHOLHDL  Lab Results   Component Value Date    TSH 1.4573 06/02/2021     Lab Results   Component Value Date    PHUR 6.0 01/08/2018    PROTEINUA Negative 06/02/2021    GLUCUA Negative 06/02/2021    KETONESU Negative 06/02/2021    OCCULTUA Negative 06/02/2021    NITRITE Negative 01/08/2018    LEUKOCYTESUR Negative 06/29/2020           Assessment and Plan (including Health Maintenance)     Plan: Virtual visit in 1 month to eval insomnia and review labs.        There are no preventive care reminders to display for this patient.    Problem List Items Addressed This Visit        Psychiatric    Drug-induced insomnia    Current Assessment & Plan     Rx elavil 25 mg qhs prn.  Avoid caffeine, alcohol and stimulants.  Do not use illicit drugs.  Practice positive phrases and repeat throughout the day, yoga, lavender scents or Chamomile tea to promote relaxation.  Set healthy boundaries, avoid people and conversations that increase stress.  Power down electronic devices at least one hour prior to bedtime.  Keep room dark; use eye mask or relaxation sound machine to promote rest.  Sleep hygiene refers to actions that tend to improve and maintain good sleep:  Sleep as long as necessary to feel rested (usually seven to eight hours for adults) and then get out of bed  Maintain a regular sleep schedule, particularly a regular wake-up time in the morning  Avoid smoking or other nicotine intake, particularly during the evening  Avoid daytime naps, especially if they are longer than 20 to 30 minutes or occur late in the day.                ENT    Allergic rhinitis    Current Assessment & Plan     Continue claritin.  Avoid/limit triggers such as pollen, dust, molds, and pet dander.   Avoid smoke, strong chemicals, cleaning products, perfumes/colognes.                ID    HIV disease    Current Assessment & Plan     Keep ID appts/diagnostics as scheduled.  Take meds as prescribed.  Educated patient  on importance of medication adherence and resistance.   Educated on compliance with barrier protection (condoms) during sexual intercourse to prevent other STI's.               History of syphilis    Current Assessment & Plan     Recently treated for syphilis.  Safe sex practices encouraged.              Endocrine    Vitamin D deficiency    Current Assessment & Plan     Educated on increasing foods high in Vitamin D such as fish oil, cod liver oil, salmon, milk fortified with vitamin D.  Take Vitamin D 2000 I.U. tablets daily (purchase over the counter).  Repeat Vitamin D level as ordered.                Other    Nicotine dependence due to vaping non-tobacco product - Primary    Current Assessment & Plan     Smoking cessation discussed.   Pt not ready to quit.  Declines Smoking Cessation program.  Discussed benefits of quitting including improved health, decreased cardiac/vascular/pulmonary/stroke risks as well as saving money.             Wellness examination    Current Assessment & Plan     Eye Exam - Last Eye Exam 6/2021  Dental Exam - Contacted Priscila Michelle; will re-attempt in fall.  Vaccinations: Flu - 10/7/20 / Covid - 3/20/21 & 4/10/21 / Pneumonia - 7/17/15 & 2/1/21 / Shingles - / Tetanus - UTD (9/28/18)  Labwork - ordered; pt is fasting.              Relevant Orders    Urinalysis, Reflex to Urine Culture Urine, Clean Catch      Other Visit Diagnoses     Screening cholesterol level        Relevant Orders    Lipid Panel    Screening for diabetes mellitus        Relevant Orders    Hemoglobin A1C    Screening for thyroid disorder        Relevant Orders    TSH          Health Maintenance Topics with due status: Not Due       Topic Last Completion Date    TETANUS VACCINE 09/28/2015    Pneumococcal Vaccines (Age 0-64) 02/01/2021       Future Appointments   Date Time Provider Department Center   6/17/2022  8:00 AM LAB, [facility] [place] [facility]   7/14/2022 12:00 PM Patience Lior, FNP [facility]    9/12/2022  8:10 AM [facility]            Signature:  KATIE Brown  [facility]  [address] [facility] [address]    Date of encounter: 6/17/22

## 2022-06-17 NOTE — ASSESSMENT & PLAN NOTE
Educated on increasing foods high in Vitamin D such as fish oil, cod liver oil, salmon, milk fortified with vitamin D.  Take Vitamin D 2000 I.U. tablets daily (purchase over the counter).  Repeat Vitamin D level as ordered.

## 2022-06-17 NOTE — ASSESSMENT & PLAN NOTE
Smoking cessation discussed.   Pt not ready to quit.  Declines Smoking Cessation program.  Discussed benefits of quitting including improved health, decreased cardiac/vascular/pulmonary/stroke risks as well as saving money.

## 2022-06-22 LAB
M FLAG: NORMAL
M UNIT OF MEASURE: NORMAL
REF LAB TEST NAME: NORMAL
REF LAB TEST REF RANGE: NORMAL
REF LAB TEST RESULTS: NEGATIVE

## 2022-08-02 ENCOUNTER — OFFICE VISIT (OUTPATIENT)
Dept: INFECTIOUS DISEASES | Facility: CLINIC | Age: 32
End: 2022-08-02
Payer: MEDICAID

## 2022-08-02 VITALS
HEIGHT: 71 IN | HEART RATE: 74 BPM | RESPIRATION RATE: 16 BRPM | BODY MASS INDEX: 23.52 KG/M2 | TEMPERATURE: 99 F | DIASTOLIC BLOOD PRESSURE: 62 MMHG | SYSTOLIC BLOOD PRESSURE: 113 MMHG | WEIGHT: 168 LBS

## 2022-08-02 DIAGNOSIS — K62.9 ANAL LESION: ICD-10-CM

## 2022-08-02 DIAGNOSIS — Z11.3 ROUTINE SCREENING FOR STI (SEXUALLY TRANSMITTED INFECTION): ICD-10-CM

## 2022-08-02 DIAGNOSIS — Z12.9 CANCER SCREENING: ICD-10-CM

## 2022-08-02 DIAGNOSIS — F17.200 NICOTINE DEPENDENCE DUE TO VAPING NON-TOBACCO PRODUCT: ICD-10-CM

## 2022-08-02 DIAGNOSIS — B20 HIV DISEASE: Primary | ICD-10-CM

## 2022-08-02 DIAGNOSIS — B20 HIV DISEASE: ICD-10-CM

## 2022-08-02 DIAGNOSIS — A60.9 HSV (HERPES SIMPLEX VIRUS) ANOGENITAL INFECTION: Primary | ICD-10-CM

## 2022-08-02 DIAGNOSIS — Z86.19 HISTORY OF SYPHILIS: ICD-10-CM

## 2022-08-02 LAB
ABS NEUT CALC (OHS): 3.42 X10(3)/MCL (ref 2.1–9.2)
ALBUMIN SERPL-MCNC: 4.3 GM/DL (ref 3.5–5)
ALBUMIN/GLOB SERPL: 1.5 RATIO (ref 1.1–2)
ALP SERPL-CCNC: 36 UNIT/L (ref 40–150)
ALT SERPL-CCNC: 41 UNIT/L (ref 0–55)
AST SERPL-CCNC: 29 UNIT/L (ref 5–34)
BILIRUBIN DIRECT+TOT PNL SERPL-MCNC: 0.3 MG/DL
BUN SERPL-MCNC: 32 MG/DL (ref 8.9–20.6)
C TRACH DNA SPEC QL NAA+PROBE: NOT DETECTED
C TRACH DNA SPEC QL NAA+PROBE: NOT DETECTED
CALCIUM SERPL-MCNC: 9.4 MG/DL (ref 8.4–10.2)
CHLORIDE SERPL-SCNC: 103 MMOL/L (ref 98–107)
CO2 SERPL-SCNC: 28 MMOL/L (ref 22–29)
CREAT SERPL-MCNC: 0.83 MG/DL (ref 0.73–1.18)
EOSINOPHIL NFR BLD MANUAL: 0.06 X10(3)/MCL (ref 0–0.9)
EOSINOPHIL NFR BLD MANUAL: 1 % (ref 0–8)
ERYTHROCYTE [DISTWIDTH] IN BLOOD BY AUTOMATED COUNT: 13.1 % (ref 11.5–17)
GLOBULIN SER-MCNC: 2.9 GM/DL (ref 2.4–3.5)
GLUCOSE SERPL-MCNC: 81 MG/DL (ref 74–100)
HCT VFR BLD AUTO: 43.1 % (ref 42–52)
HGB BLD-MCNC: 13.8 GM/DL (ref 14–18)
IMM GRANULOCYTES # BLD AUTO: 0.02 X10(3)/MCL (ref 0–0.04)
IMM GRANULOCYTES NFR BLD AUTO: 0.3 %
LYMPH ABN # BLD MANUAL: 3 %
LYMPHOCYTES NFR BLD MANUAL: 1.95 X10(3)/MCL
LYMPHOCYTES NFR BLD MANUAL: 32 % (ref 13–40)
MCH RBC QN AUTO: 31.5 PG (ref 27–31)
MCHC RBC AUTO-ENTMCNC: 32 MG/DL (ref 33–36)
MCV RBC AUTO: 98.4 FL (ref 80–94)
MONOCYTES NFR BLD MANUAL: 0.49 X10(3)/MCL (ref 0.1–1.3)
MONOCYTES NFR BLD MANUAL: 8 % (ref 2–11)
N GONORRHOEA DNA SPEC QL NAA+PROBE: NOT DETECTED
N GONORRHOEA DNA SPEC QL NAA+PROBE: NOT DETECTED
NEUTROPHILS NFR BLD MANUAL: 56 % (ref 47–80)
NRBC BLD AUTO-RTO: 0 %
PLATELET # BLD AUTO: 232 X10(3)/MCL (ref 130–400)
PLATELET # BLD EST: ADEQUATE 10*3/UL
PMV BLD AUTO: 10.2 FL (ref 7.4–10.4)
POTASSIUM SERPL-SCNC: 4.6 MMOL/L (ref 3.5–5.1)
PROT SERPL-MCNC: 7.2 GM/DL (ref 6.4–8.3)
RBC # BLD AUTO: 4.38 X10(6)/MCL (ref 4.7–6.1)
RBC MORPH BLD: NORMAL
SODIUM SERPL-SCNC: 137 MMOL/L (ref 136–145)
T PALLIDUM AB SER QL: REACTIVE
WBC # SPEC AUTO: 6.1 X10(3)/MCL (ref 4.5–11.5)

## 2022-08-02 PROCEDURE — 85025 COMPLETE CBC W/AUTO DIFF WBC: CPT

## 2022-08-02 PROCEDURE — 3078F PR MOST RECENT DIASTOLIC BLOOD PRESSURE < 80 MM HG: ICD-10-PCS | Mod: CPTII,,, | Performed by: NURSE PRACTITIONER

## 2022-08-02 PROCEDURE — 3008F BODY MASS INDEX DOCD: CPT | Mod: CPTII,,, | Performed by: NURSE PRACTITIONER

## 2022-08-02 PROCEDURE — 99213 OFFICE O/P EST LOW 20 MIN: CPT | Mod: PBBFAC | Performed by: NURSE PRACTITIONER

## 2022-08-02 PROCEDURE — 1160F PR REVIEW ALL MEDS BY PRESCRIBER/CLIN PHARMACIST DOCUMENTED: ICD-10-PCS | Mod: CPTII,,, | Performed by: NURSE PRACTITIONER

## 2022-08-02 PROCEDURE — 1159F MED LIST DOCD IN RCRD: CPT | Mod: CPTII,,, | Performed by: NURSE PRACTITIONER

## 2022-08-02 PROCEDURE — 80053 COMPREHEN METABOLIC PANEL: CPT | Performed by: NURSE PRACTITIONER

## 2022-08-02 PROCEDURE — 99215 PR OFFICE/OUTPT VISIT, EST, LEVL V, 40-54 MIN: ICD-10-PCS | Mod: 25,S$PBB,, | Performed by: NURSE PRACTITIONER

## 2022-08-02 PROCEDURE — 87591 N.GONORRHOEAE DNA AMP PROB: CPT | Performed by: NURSE PRACTITIONER

## 2022-08-02 PROCEDURE — 99406 PR TOBACCO USE CESSATION INTERMEDIATE 3-10 MINUTES: ICD-10-PCS | Mod: S$PBB,,, | Performed by: NURSE PRACTITIONER

## 2022-08-02 PROCEDURE — 36415 COLL VENOUS BLD VENIPUNCTURE: CPT

## 2022-08-02 PROCEDURE — 1159F PR MEDICATION LIST DOCUMENTED IN MEDICAL RECORD: ICD-10-PCS | Mod: CPTII,,, | Performed by: NURSE PRACTITIONER

## 2022-08-02 PROCEDURE — 1160F RVW MEDS BY RX/DR IN RCRD: CPT | Mod: CPTII,,, | Performed by: NURSE PRACTITIONER

## 2022-08-02 PROCEDURE — 3008F PR BODY MASS INDEX (BMI) DOCUMENTED: ICD-10-PCS | Mod: CPTII,,, | Performed by: NURSE PRACTITIONER

## 2022-08-02 PROCEDURE — 3078F DIAST BP <80 MM HG: CPT | Mod: CPTII,,, | Performed by: NURSE PRACTITIONER

## 2022-08-02 PROCEDURE — 99406 BEHAV CHNG SMOKING 3-10 MIN: CPT | Mod: S$PBB,,, | Performed by: NURSE PRACTITIONER

## 2022-08-02 PROCEDURE — 3074F PR MOST RECENT SYSTOLIC BLOOD PRESSURE < 130 MM HG: ICD-10-PCS | Mod: CPTII,,, | Performed by: NURSE PRACTITIONER

## 2022-08-02 PROCEDURE — 87491 CHLMYD TRACH DNA AMP PROBE: CPT | Performed by: NURSE PRACTITIONER

## 2022-08-02 PROCEDURE — 99215 OFFICE O/P EST HI 40 MIN: CPT | Mod: 25,S$PBB,, | Performed by: NURSE PRACTITIONER

## 2022-08-02 PROCEDURE — 86592 SYPHILIS TEST NON-TREP QUAL: CPT

## 2022-08-02 PROCEDURE — 3074F SYST BP LT 130 MM HG: CPT | Mod: CPTII,,, | Performed by: NURSE PRACTITIONER

## 2022-08-02 PROCEDURE — 87529 HSV DNA AMP PROBE: CPT

## 2022-08-02 NOTE — PROGRESS NOTES
Subjective:       Patient ID: Jacob Gee is a 32 y.o. male.    Chief Complaint: b20 ,possible herpes exposure    8/2/22  Jacob is a 31 yo WM presenting today for HIV f/u visit.  He reports 100% adherent to Genvoya, tolerates well with viral suppression.  Last labs 5/22 VL UD, CD4 693.  Will update labs today. RPR titer increased from 4 to 8 dils 5/10/22 & he received retreatment with Bicillin 7.2 mil units IM & has not been sexually active since 1/2022.  Last STI screening swabs for ct/gc collected 1/22 were negative, will repeat today as well.  He also has a single, tender vesicular lesion to right buttock between rectum & scrotum, presented 2-3 days ago.  He tells me that this is the sight of recurrent outbreak every so often, maybe a couple of times per year.  Will swab for HSV.  He appreciates same.  He did have a sinus infection about 3 weeks ago, negative COVID.  Symptoms were congestion, fever, body aches, diarrhea, and vomiting.  Again, he tells me that he has not been sexually active since 1/2022.  No oral lesions.  No penile lesions.  Due for repeat anal pap, will collect same today.  All questions answered & concerns address    5/10/22  Jacob is a 32 yo WM presenting today for HIV f/u visit.  He reports 100% adherent to Genvoya, tolerates well with viral suppression.  Last labs 1/4/22 HIV UD, CD4 595/40.2%.  Serofast syphilis at 4 dils 1/22.  STI screenings negative 1/22.  Denies any unprotected sexual encounters since last visit, declines need for STI screening swabs at this juncture. History of anal ASCUS, last pap 2/2021 NIL.  Will repeat anal pap next visit as requested.  Last ophth exam 6/2021.  He is still vaping 5% nicotine product, not ready to quit.  All questions answered & concerns addressed.      1/4/22  Jacob is a 32 yo HIV + WM evaluated by audio-video telemedicine.  He is located in his personal vehicle, and I, the provider, am located at [facility].  We are both located in [place], the  state in which I am licensed to practice.  The patient consents to telemedicine visit and is the only individual online.  The patient (or patient's representative) stated that they understood and accepted the privacy and security risks to their information at their location.   He reports 100% adherent to Genvoya & tolerates well with viral suppression.  Last labs 9/20/21 VL <20, CD4 634.  He will come to clinic today for repeat labs.  He appreciates full STI screening as well.  Last syphilis titer 6/2021 2 dils, will repeat today.  He will self-collect anal swab for ct/gc in clinic today.  He tells me that he is still vaping 5% nicotine, has not started to wean down as of yet but does remain clean.  He requests refills on Claritin today, will send to [facility] as requested.  He remains in care with PCP CEM Tinajero NP.  Today, he also reports a single, small, red, non-draining, tender spot near umbilicus that has been present a couple of days.  He tells me that it looks very much like an ingrown hair which would be consistent for the region.  Will treat with topical antimicrobial to minimize risk of worsening, voiced appreciation.  All questions answered & concerns addressed.  Face to face time spent with patient exceeds 15 minutes, over 50% of which was used for education and counseling regarding medical conditions, current medications including risk/benefit and side effects/adverse events, over the counter medications-uses/doses, home self-care and contact precautions, and red flags and indications for immediate medical attention.  The patient is receptive, expresses understanding and is agreeable to plan. All questions answered.       Review of Systems   Constitutional: Negative.    HENT: Negative.    Respiratory: Negative.    Cardiovascular: Negative.    Gastrointestinal: Negative.    Genitourinary: Positive for genital sores.   Integumentary:  Negative.   Neurological: Negative.    Hematological: Negative.     Psychiatric/Behavioral: Negative.          Objective:      Physical Exam  Vitals reviewed.   Constitutional:       General: He is not in acute distress.     Appearance: Normal appearance. He is not toxic-appearing.   HENT:      Head: Normocephalic.      Mouth/Throat:      Mouth: Mucous membranes are moist.      Pharynx: Oropharynx is clear.   Eyes:      Conjunctiva/sclera: Conjunctivae normal.   Cardiovascular:      Rate and Rhythm: Normal rate and regular rhythm.      Heart sounds: Normal heart sounds.   Pulmonary:      Effort: Pulmonary effort is normal. No respiratory distress.      Breath sounds: Normal breath sounds.   Abdominal:      General: Abdomen is flat. Bowel sounds are normal. There is no distension.      Palpations: Abdomen is soft.      Tenderness: There is no abdominal tenderness.   Genitourinary:     Prostate: Normal. Not tender.      Rectum: No tenderness, anal fissure, external hemorrhoid or internal hemorrhoid. Normal anal tone.   Musculoskeletal:         General: Normal range of motion.      Cervical back: Normal range of motion.   Lymphadenopathy:      Cervical: No cervical adenopathy.   Skin:     General: Skin is warm and dry.          Neurological:      General: No focal deficit present.      Mental Status: He is alert and oriented to person, place, and time. Mental status is at baseline.   Psychiatric:         Mood and Affect: Mood normal.         Behavior: Behavior normal.         Thought Content: Thought content normal.         Judgment: Judgment normal.         Assessment:       Problem List Items Addressed This Visit        ID    HIV disease - Primary    Relevant Medications    elviteg-cob-emtri-tenof ALAFEN (GENVOYA) [phone] mg Tab    Other Relevant Orders    CD4 Lymphocytes    CBC Auto Differential    Comprehensive Metabolic Panel    HIV-1 RNA, Quantitative, PCR with Reflex to Genotype    History of syphilis    Relevant Orders    SYPHILIS ANTIBODY (WITH REFLEX RPR)      Other  Visit Diagnoses     Anal lesion        Relevant Orders    Herpes simplex Virus (HSV) Type 1 & 2 DNA by PCR    Routine screening for STI (sexually transmitted infection)        Relevant Orders    Chlamydia/GC, PCR    Chlamydia/GC, PCR    C.trach/N.gonor AMP RNA, (Non-Genital)    Cancer screening        Relevant Orders    Cytology, Fluid/Wash/Brush          Plan:       HIV disease  -     elviteg-cob-emtri-tenof ALAFEN (GENVOYA) [phone] mg Tab; Take 1 tablet by mouth once daily at 6am.  Dispense: 30 tablet; Refill: 3  -     CD4 Lymphocytes; Future; Expected date: 08/02/2022  -     CBC Auto Differential; Future; Expected date: 08/02/2022  -     Comprehensive Metabolic Panel  -     HIV-1 RNA, Quantitative, PCR with Reflex to Genotype; Future; Expected date: 08/02/2022  Adherence and sexual health counseling done.  Use condoms for all sexual encounters.  Blood precautions.   Continue Genvoya as prescribed.  Labs today.  RTC 4 months with Margaux.    HIV Wellness:  Anal pap: 2/21 NIL, 8/22  Oral CT/GC: 1/22 Neg, 8/22  Anal CT/GC: 1/22 Neg, 8/22  Urine CT/GC: 1/22 Neg, 8/22  RPR: 5/22 8 dils, 8/22  Ophth: 6/21      Anal lesion  -     Herpes simplex Virus (HSV) Type 1 & 2 DNA by PCR; Future; Expected date: 08/02/2022  Swab for HSV collected.   Low risk for MPX, not sexually active since 1/22    Routine screening for STI (sexually transmitted infection)  -     Chlamydia/GC, PCR  -     Chlamydia/GC, PCR  -     C.trach/N.gonor AMP RNA, (Non-Genital); Future; Expected date: 08/02/2022  Oral, anal, urine samples for ct/gc collected    History of syphilis  -     SYPHILIS ANTIBODY (WITH REFLEX RPR); Future; Expected date: 08/02/2022  Repeat RPR today     Cancer screening  -     Cytology, Fluid/Wash/Brush  Anal pap collected     Nicotine dependence, vaping product    Smoking cessation encouraged.  Pt is not ready to quit.   Discussed benefits of quitting to include but not limited to improved overall health, decreased  cardiac/vascular/pulmonary/stroke risks, as well as financial benefits.

## 2022-08-04 LAB
AGE: 32
C TRACH RRNA SPEC QL NAA+PROBE: NEGATIVE
CD3+CD4+ CELLS # BLD: 32 % (ref 28–48)
CD3+CD4+ CELLS # SPEC: 714.43 UNIT/L (ref 589–1505)
CD3+CD4+ CELLS NFR BLD: 36.6 %
LYMPHOCYTES # BLD AUTO: 1952 X10(3)/MCL (ref 1260–5520)
LYMPHOMA - T-CELL MARKERS SPEC-IMP: NORMAL
N GONORRHOEA RRNA SPEC QL NAA+PROBE: NEGATIVE
RPR SER QL: ABNORMAL
RPR SER QL: REACTIVE
RPR SER-TITR: ABNORMAL {TITER}
SPECIMEN SOURCE: NORMAL
SPECIMEN SOURCE: NORMAL
WBC # BLD AUTO: 6100 /MM3 (ref 4500–11500)

## 2022-08-05 LAB — HIV1 RNA # PLAS NAA DL=20: <20 COPIES/ML

## 2022-08-08 LAB
ESTROGEN SERPL-MCNC: NORMAL PG/ML
INSULIN SERPL-ACNC: NORMAL U[IU]/ML
LAB AP CLINICAL INFORMATION: NORMAL
LAB AP COMMENTS: NORMAL
LAB AP NON-GYN INTERPRETATION SPECIMEN 1: NORMAL
LAB AP NON-GYN SPECIMEN 1 ADEQUACY: NORMAL

## 2022-08-11 LAB
HSV1 DNA CSF QL NAA+NON-PROBE: DETECTED
SPECIMEN SOURCE: ABNORMAL

## 2022-08-15 ENCOUNTER — TELEPHONE (OUTPATIENT)
Dept: INFECTIOUS DISEASES | Facility: CLINIC | Age: 32
End: 2022-08-15
Payer: MEDICAID

## 2022-08-15 RX ORDER — VALACYCLOVIR HYDROCHLORIDE 1 G/1
1000 TABLET, FILM COATED ORAL 2 TIMES DAILY
Qty: 10 TABLET | Refills: 1 | Status: SHIPPED | OUTPATIENT
Start: 2022-08-15 | End: 2022-09-06

## 2022-08-15 NOTE — TELEPHONE ENCOUNTER
----- Message from Reina Weekly sent at 8/15/2022 10:33 AM CDT -----  Regarding: SCC ID: Pt called regarding lab results viewed on the portal and wanting an appt asap to discuss  MARGAUX PT    Pt called regarding lab results he saw on the on portal regarding Herpes.  Pt would like to  make an appt with Margaux to discuss as soon as possible.    Please advise if okay to make appt f/up. Margaux may have an open slot this week.      Pt can be reached at [phone]

## 2022-08-15 NOTE — TELEPHONE ENCOUNTER
Phoned pt with results.  He tells me that the swabbed lesion has now healed. Has historically had outbreaks every 4-5 months. Will send Valtrex prescription to [facility] for him to have on-hand for next outbreak. Sexual health counseling & transmission risk reduction measures discussed. Voiced understanding.

## 2022-08-15 NOTE — TELEPHONE ENCOUNTER
Margaux please advise of any treatment or instructions as you do not have an opening for appt this week

## 2022-08-30 ENCOUNTER — TELEPHONE (OUTPATIENT)
Dept: INFECTIOUS DISEASES | Facility: CLINIC | Age: 32
End: 2022-08-30
Payer: MEDICAID

## 2022-08-30 DIAGNOSIS — A53.9 SYPHILIS: Primary | ICD-10-CM

## 2022-08-30 NOTE — TELEPHONE ENCOUNTER
----- Message from Anaid Braswell sent at 8/30/2022  1:12 PM CDT -----  Regarding: SCC ID: Possible Exposure  KAILYN PT       Pt wants to speak with the nurse regarding possible exposure to syphilis.   Please call back @ [phone]

## 2022-08-31 NOTE — TELEPHONE ENCOUNTER
Spoke to Jacob. His friend (partner) had syphilis and received 3 injections. Friend retested for syphilis and levels are still going up.    Jacob asking if he needs to do labs?    Please advise.

## 2022-09-01 NOTE — TELEPHONE ENCOUNTER
I called and spoke to pt re need for treatment wit bicillin x 1 . Pt verbalizes understanding and would like to come in today for injections    Una please add pt to nurse visit for today for bicillin injection

## 2022-09-01 NOTE — TELEPHONE ENCOUNTER
Yes, let's go ahead and retreat with Bicillin 2.4 mil units IM x 1 for syphilis re-exposure.  Please notify pt and encourage abstinence.  Thank you.

## 2022-09-02 ENCOUNTER — CLINICAL SUPPORT (OUTPATIENT)
Dept: INFECTIOUS DISEASES | Facility: CLINIC | Age: 32
End: 2022-09-02
Payer: MEDICAID

## 2022-09-02 DIAGNOSIS — Z86.19 HISTORY OF SYPHILIS: Primary | ICD-10-CM

## 2022-09-02 PROCEDURE — 96372 THER/PROPH/DIAG INJ SC/IM: CPT | Mod: PBBFAC

## 2022-09-02 RX ADMIN — PENICILLIN G BENZATHINE 2.4 MILLION UNITS: 1200000 INJECTION, SUSPENSION INTRAMUSCULAR at 08:09

## 2022-09-02 NOTE — NURSING
Patient given a total of Bicillin 2.4 million units. 1.2 in left gluteal and 1.2 in right gluteal. Patient observed for 15 minutes for reactions, none noted. Patient tolerated well.

## 2022-09-19 PROBLEM — Z00.00 WELLNESS EXAMINATION: Status: RESOLVED | Noted: 2022-06-17 | Resolved: 2022-09-19

## 2022-09-22 ENCOUNTER — HISTORICAL (OUTPATIENT)
Dept: ADMINISTRATIVE | Facility: HOSPITAL | Age: 32
End: 2022-09-22
Payer: MEDICAID

## 2022-12-05 ENCOUNTER — OFFICE VISIT (OUTPATIENT)
Dept: INFECTIOUS DISEASES | Facility: CLINIC | Age: 32
End: 2022-12-05
Payer: MEDICAID

## 2022-12-05 VITALS
OXYGEN SATURATION: 98 % | RESPIRATION RATE: 20 BRPM | SYSTOLIC BLOOD PRESSURE: 112 MMHG | DIASTOLIC BLOOD PRESSURE: 67 MMHG | BODY MASS INDEX: 23.8 KG/M2 | WEIGHT: 170 LBS | TEMPERATURE: 98 F | HEART RATE: 92 BPM | HEIGHT: 71 IN

## 2022-12-05 DIAGNOSIS — Z86.19 HISTORY OF SYPHILIS: ICD-10-CM

## 2022-12-05 DIAGNOSIS — F17.210 CIGARETTE NICOTINE DEPENDENCE WITHOUT COMPLICATION: ICD-10-CM

## 2022-12-05 DIAGNOSIS — B20 HIV DISEASE: Primary | ICD-10-CM

## 2022-12-05 DIAGNOSIS — A60.9 HSV (HERPES SIMPLEX VIRUS) ANOGENITAL INFECTION: ICD-10-CM

## 2022-12-05 DIAGNOSIS — T78.40XS ALLERGY, SEQUELA: ICD-10-CM

## 2022-12-05 DIAGNOSIS — G47.00 INSOMNIA, UNSPECIFIED TYPE: ICD-10-CM

## 2022-12-05 DIAGNOSIS — Z23 NEED FOR VACCINATION: ICD-10-CM

## 2022-12-05 LAB
ALBUMIN SERPL-MCNC: 4.4 GM/DL (ref 3.5–5)
ALBUMIN/GLOB SERPL: 1.6 RATIO (ref 1.1–2)
ALP SERPL-CCNC: 39 UNIT/L (ref 40–150)
ALT SERPL-CCNC: 28 UNIT/L (ref 0–55)
AST SERPL-CCNC: 27 UNIT/L (ref 5–34)
BASOPHILS # BLD AUTO: 0.03 X10(3)/MCL (ref 0–0.2)
BASOPHILS NFR BLD AUTO: 0.5 %
BILIRUBIN DIRECT+TOT PNL SERPL-MCNC: 0.2 MG/DL
BUN SERPL-MCNC: 19 MG/DL (ref 8.9–20.6)
CALCIUM SERPL-MCNC: 9.5 MG/DL (ref 8.4–10.2)
CHLORIDE SERPL-SCNC: 109 MMOL/L (ref 98–107)
CO2 SERPL-SCNC: 26 MMOL/L (ref 22–29)
CREAT SERPL-MCNC: 1.12 MG/DL (ref 0.73–1.18)
EOSINOPHIL # BLD AUTO: 0.03 X10(3)/MCL (ref 0–0.9)
EOSINOPHIL NFR BLD AUTO: 0.5 %
ERYTHROCYTE [DISTWIDTH] IN BLOOD BY AUTOMATED COUNT: 13.5 % (ref 11.5–17)
GFR SERPLBLD CREATININE-BSD FMLA CKD-EPI: >60 MLS/MIN/1.73/M2
GLOBULIN SER-MCNC: 2.8 GM/DL (ref 2.4–3.5)
GLUCOSE SERPL-MCNC: 71 MG/DL (ref 74–100)
HCT VFR BLD AUTO: 41.6 % (ref 42–52)
HGB BLD-MCNC: 13.8 GM/DL (ref 14–18)
IMM GRANULOCYTES # BLD AUTO: 0.01 X10(3)/MCL (ref 0–0.04)
IMM GRANULOCYTES NFR BLD AUTO: 0.2 %
LYMPHOCYTES # BLD AUTO: 1.48 X10(3)/MCL (ref 0.6–4.6)
LYMPHOCYTES NFR BLD AUTO: 23 %
MCH RBC QN AUTO: 33.1 PG (ref 27–31)
MCHC RBC AUTO-ENTMCNC: 33.2 MG/DL (ref 33–36)
MCV RBC AUTO: 99.8 FL (ref 80–94)
MONOCYTES # BLD AUTO: 0.7 X10(3)/MCL (ref 0.1–1.3)
MONOCYTES NFR BLD AUTO: 10.9 %
NEUTROPHILS # BLD AUTO: 4.2 X10(3)/MCL (ref 2.1–9.2)
NEUTROPHILS NFR BLD AUTO: 64.9 %
NRBC BLD AUTO-RTO: 0 %
PLATELET # BLD AUTO: 223 X10(3)/MCL (ref 130–400)
PMV BLD AUTO: 10.4 FL (ref 7.4–10.4)
POTASSIUM SERPL-SCNC: 4 MMOL/L (ref 3.5–5.1)
PROT SERPL-MCNC: 7.2 GM/DL (ref 6.4–8.3)
RBC # BLD AUTO: 4.17 X10(6)/MCL (ref 4.7–6.1)
SODIUM SERPL-SCNC: 143 MMOL/L (ref 136–145)
T PALLIDUM AB SER QL: REACTIVE
WBC # SPEC AUTO: 6.4 X10(3)/MCL (ref 4.5–11.5)

## 2022-12-05 PROCEDURE — 99213 OFFICE O/P EST LOW 20 MIN: CPT | Mod: PBBFAC | Performed by: NURSE PRACTITIONER

## 2022-12-05 PROCEDURE — 3078F PR MOST RECENT DIASTOLIC BLOOD PRESSURE < 80 MM HG: ICD-10-PCS | Mod: CPTII,,, | Performed by: NURSE PRACTITIONER

## 2022-12-05 PROCEDURE — 3078F DIAST BP <80 MM HG: CPT | Mod: CPTII,,, | Performed by: NURSE PRACTITIONER

## 2022-12-05 PROCEDURE — 3008F BODY MASS INDEX DOCD: CPT | Mod: CPTII,,, | Performed by: NURSE PRACTITIONER

## 2022-12-05 PROCEDURE — 86361 T CELL ABSOLUTE COUNT: CPT | Performed by: NURSE PRACTITIONER

## 2022-12-05 PROCEDURE — 36415 COLL VENOUS BLD VENIPUNCTURE: CPT | Performed by: NURSE PRACTITIONER

## 2022-12-05 PROCEDURE — 1160F PR REVIEW ALL MEDS BY PRESCRIBER/CLIN PHARMACIST DOCUMENTED: ICD-10-PCS | Mod: CPTII,,, | Performed by: NURSE PRACTITIONER

## 2022-12-05 PROCEDURE — 3074F PR MOST RECENT SYSTOLIC BLOOD PRESSURE < 130 MM HG: ICD-10-PCS | Mod: CPTII,,, | Performed by: NURSE PRACTITIONER

## 2022-12-05 PROCEDURE — 86592 SYPHILIS TEST NON-TREP QUAL: CPT | Performed by: NURSE PRACTITIONER

## 2022-12-05 PROCEDURE — 99214 OFFICE O/P EST MOD 30 MIN: CPT | Mod: S$PBB,,, | Performed by: NURSE PRACTITIONER

## 2022-12-05 PROCEDURE — 85025 COMPLETE CBC W/AUTO DIFF WBC: CPT | Performed by: NURSE PRACTITIONER

## 2022-12-05 PROCEDURE — 3008F PR BODY MASS INDEX (BMI) DOCUMENTED: ICD-10-PCS | Mod: CPTII,,, | Performed by: NURSE PRACTITIONER

## 2022-12-05 PROCEDURE — 87536 HIV-1 QUANT&REVRSE TRNSCRPJ: CPT | Performed by: NURSE PRACTITIONER

## 2022-12-05 PROCEDURE — 1159F PR MEDICATION LIST DOCUMENTED IN MEDICAL RECORD: ICD-10-PCS | Mod: CPTII,,, | Performed by: NURSE PRACTITIONER

## 2022-12-05 PROCEDURE — 1160F RVW MEDS BY RX/DR IN RCRD: CPT | Mod: CPTII,,, | Performed by: NURSE PRACTITIONER

## 2022-12-05 PROCEDURE — 1159F MED LIST DOCD IN RCRD: CPT | Mod: CPTII,,, | Performed by: NURSE PRACTITIONER

## 2022-12-05 PROCEDURE — 3074F SYST BP LT 130 MM HG: CPT | Mod: CPTII,,, | Performed by: NURSE PRACTITIONER

## 2022-12-05 PROCEDURE — 90686 IIV4 VACC NO PRSV 0.5 ML IM: CPT | Mod: PBBFAC

## 2022-12-05 PROCEDURE — 80053 COMPREHEN METABOLIC PANEL: CPT | Performed by: NURSE PRACTITIONER

## 2022-12-05 PROCEDURE — 99214 PR OFFICE/OUTPT VISIT, EST, LEVL IV, 30-39 MIN: ICD-10-PCS | Mod: S$PBB,,, | Performed by: NURSE PRACTITIONER

## 2022-12-05 PROCEDURE — 86780 TREPONEMA PALLIDUM: CPT | Performed by: NURSE PRACTITIONER

## 2022-12-05 RX ORDER — HYDROXYZINE PAMOATE 25 MG/1
25 CAPSULE ORAL NIGHTLY
Qty: 30 CAPSULE | Refills: 1 | Status: SHIPPED | OUTPATIENT
Start: 2022-12-05 | End: 2023-04-18 | Stop reason: SDUPTHER

## 2022-12-05 RX ORDER — ACETAMINOPHEN 500 MG
TABLET ORAL DAILY
COMMUNITY

## 2022-12-05 RX ORDER — MULTIVITAMIN
1 TABLET ORAL DAILY
COMMUNITY

## 2022-12-05 RX ORDER — VARENICLINE TARTRATE 1 MG/1
1 TABLET, FILM COATED ORAL 2 TIMES DAILY
Qty: 60 TABLET | Refills: 1 | Status: SHIPPED | OUTPATIENT
Start: 2022-12-05 | End: 2023-04-18 | Stop reason: SDUPTHER

## 2022-12-05 RX ORDER — VALACYCLOVIR HYDROCHLORIDE 1 G/1
1000 TABLET, FILM COATED ORAL 2 TIMES DAILY
Qty: 10 TABLET | Refills: 1 | Status: SHIPPED | OUTPATIENT
Start: 2022-12-05 | End: 2023-04-18 | Stop reason: SDUPTHER

## 2022-12-05 RX ORDER — VARENICLINE TARTRATE 0.5 (11)-1
KIT ORAL
Qty: 1 EACH | Refills: 0 | Status: SHIPPED | OUTPATIENT
Start: 2022-12-05 | End: 2023-04-18 | Stop reason: SDUPTHER

## 2022-12-05 RX ORDER — LORATADINE 10 MG/1
10 TABLET ORAL DAILY
Qty: 90 TABLET | Refills: 3 | Status: SHIPPED | OUTPATIENT
Start: 2022-12-05 | End: 2023-04-18 | Stop reason: SDUPTHER

## 2022-12-05 NOTE — NURSING
VIS printed and given to patient. Influenza Vaccine administered using aseptic technique. Patient observed for 15 minutes for reactions, none noted. Patient tolerated well.

## 2022-12-05 NOTE — PROGRESS NOTES
Subjective:       Patient ID: Jacob Gee is a 32 y.o. male.    Chief Complaint: No chief complaint on file.    12/5/22  Jacob is a 31 yo WM presenting today for HIV f/u visit. He is taking Genvoya daily and tolerates well with viral suppression.  Labs 8/22 VL <20, CD4 714.  Syphilis is serofast at 8 dils. In monogamous relationship with Hakeem.  Oral, anal, urine samples negative for ct/gc 8/2/22. HSV confirmed + to anogenital lesion last visit, responsive to PRN use of Valtrex.  Appreciates flu vaccine today. Anal pap NIL 8/2/22.  He tells me that he does not sleep well, trouble falling & staying asleep.  Has taken Elavil in the past, but did not like the hangover effect. Amenable to trying Hydroxyzine for same. Will call me if refills are needed.  He remains clean, has gone back to smoking cigarettes.  Mood is stable, amenable to trying Chantix for cessation.  Potential side effects of Chantix discussed with pt, would like to try Chantix. Requesting refill on Loratadine, effective for allergies.  All questions answered & concerns addressed.     8/2/22  Jacob is a 31 yo WM presenting today for HIV f/u visit.  He reports 100% adherent to Genvoya, tolerates well with viral suppression.  Last labs 5/22 VL UD, CD4 693.  Will update labs today. RPR titer increased from 4 to 8 dils 5/10/22 & he received retreatment with Bicillin 7.2 mil units IM & has not been sexually active since 1/2022.  Last STI screening swabs for ct/gc collected 1/22 were negative, will repeat today as well.  He also has a single, tender vesicular lesion to right buttock between rectum & scrotum, presented 2-3 days ago.  He tells me that this is the sight of recurrent outbreak every so often, maybe a couple of times per year.  Will swab for HSV.  He appreciates same.  He did have a sinus infection about 3 weeks ago, negative COVID.  Symptoms were congestion, fever, body aches, diarrhea, and vomiting.  Again, he tells me that he has not been  sexually active since 1/2022.  No oral lesions.  No penile lesions.  Due for repeat anal pap, will collect same today.  All questions answered & concerns address     5/10/22  Jacob is a 32 yo WM presenting today for HIV f/u visit.  He reports 100% adherent to Genvoya, tolerates well with viral suppression.  Last labs 1/4/22 HIV UD, CD4 595/40.2%.  Serofast syphilis at 4 dils 1/22.  STI screenings negative 1/22.  Denies any unprotected sexual encounters since last visit, declines need for STI screening swabs at this juncture. History of anal ASCUS, last pap 2/2021 NIL.  Will repeat anal pap next visit as requested.  Last ophth exam 6/2021.  He is still vaping 5% nicotine product, not ready to quit.  All questions answered & concerns addressed.        Review of Systems   Constitutional: Negative.    HENT: Negative.     Respiratory: Negative.     Cardiovascular: Negative.    Gastrointestinal: Negative.    Genitourinary: Negative.    Integumentary:  Negative.   Neurological: Negative.    Hematological: Negative.    Psychiatric/Behavioral: Negative.         Objective:      Physical Exam  Vitals reviewed.   Constitutional:       General: He is not in acute distress.     Appearance: Normal appearance. He is not toxic-appearing.   HENT:      Mouth/Throat:      Mouth: Mucous membranes are moist.      Pharynx: Oropharynx is clear.   Eyes:      Conjunctiva/sclera: Conjunctivae normal.   Cardiovascular:      Rate and Rhythm: Normal rate and regular rhythm.   Pulmonary:      Effort: Pulmonary effort is normal. No respiratory distress.      Breath sounds: Normal breath sounds.   Abdominal:      General: Abdomen is flat. Bowel sounds are normal.      Palpations: Abdomen is soft.   Musculoskeletal:         General: Normal range of motion.      Cervical back: Normal range of motion.   Lymphadenopathy:      Cervical: No cervical adenopathy.   Skin:     General: Skin is warm and dry.   Neurological:      General: No focal deficit  present.      Mental Status: He is alert and oriented to person, place, and time. Mental status is at baseline.   Psychiatric:         Mood and Affect: Mood normal.         Behavior: Behavior normal.       Assessment:       Problem List Items Addressed This Visit          ID    HIV disease - Primary         Plan:         HIV disease  -     elviteg-cob-emtri-tenof ALAFEN (GENVOYA) [phone] mg Tab; Take 1 tablet by mouth once daily.  Dispense: 90 tablet; Refill: 1  -     CBC Auto Differential; Future; Expected date: 12/05/2022  -     CD4 Lymphocytes; Future; Expected date: 12/05/2022  -     Comprehensive Metabolic Panel  -     HIV-1 RNA, Quantitative, PCR with Reflex to Genotype; Future; Expected date: 12/05/2022  Adherence and sexual health counseling done.  Use condoms for all sexual encounters.  Blood precautions.   Continue Genvoya as prescribed.  Labs today.  RTC 4 months with Margaux.     HIV Wellness:  Anal pap: 8/22 NIL  Oral CT/GC: 8/22 Neg  Anal CT/GC: 8/22 Neg  Urine CT/GC: 8/22 Neg  RPR: 5/22 8 dils, 8/22 1:8  Ophth: 6/21     History of syphilis  -     SYPHILIS ANTIBODY (WITH REFLEX RPR); Future; Expected date: 12/05/2022  Serofast at 4-8 dils.   Repeat titer today.    HSV (herpes simplex virus) anogenital infection  -     valACYclovir (VALTREX) 1000 MG tablet; Take 1 tablet (1,000 mg total) by mouth 2 (two) times daily. for 5 days  Dispense: 10 tablet; Refill: 1  Valtrex PRN outbreak.    Need for vaccination  -     Influenza - Quadrivalent (PF)  Flu vaccine today.     Allergy, sequela  -     loratadine (CLARITIN) 10 mg tablet; Take 1 tablet (10 mg total) by mouth once daily.  Dispense: 90 tablet; Refill: 3  Loratadine 10 mg daily.    Cigarette nicotine dependence without complication  -     varenicline (CHANTIX STARTING MONTH BOX) 0.5 mg (11)- 1 mg (42) tablet; Take one 0.5mg tab by mouth once daily X3 days,then increase to one 0.5mg tab twice daily X4 days,then increase to one 1mg tab twice daily   Dispense: 1 each; Refill: 0  -     varenicline (CHANTIX) 1 mg Tab; Take 1 tablet (1 mg total) by mouth 2 (two) times daily.  Dispense: 60 tablet; Refill: 1  Chantix as prescribed.   ED precautions.   Discontinue if noted change in mood or behavior.  Smoking cessation encouraged.  Pt is ready to quit.   Discussed benefits of quitting to include but not limited to improved overall health, decreased cardiac/vascular/pulmonary/stroke risks, as well as financial benefits.  >3 min spent counseling.    Insomnia, unspecified type  -     hydrOXYzine pamoate (VISTARIL) 25 MG Cap; Take 1 capsule (25 mg total) by mouth nightly.  Dispense: 30 capsule; Refill: 1  Hydroxyzine 25 mg po at bedtime for sleep.   Will notify me if effective & refills are needed.

## 2022-12-06 ENCOUNTER — CLINICAL SUPPORT (OUTPATIENT)
Dept: INFECTIOUS DISEASES | Facility: CLINIC | Age: 32
End: 2022-12-06
Payer: MEDICAID

## 2022-12-06 ENCOUNTER — TELEPHONE (OUTPATIENT)
Dept: INFECTIOUS DISEASES | Facility: CLINIC | Age: 32
End: 2022-12-06
Payer: MEDICAID

## 2022-12-06 DIAGNOSIS — A53.9 SYPHILIS: Primary | ICD-10-CM

## 2022-12-06 DIAGNOSIS — A53.9 SYPHILIS (ACQUIRED): Primary | ICD-10-CM

## 2022-12-06 LAB
RPR SER QL: ABNORMAL
RPR SER QL: REACTIVE
RPR SER-TITR: ABNORMAL {TITER}

## 2022-12-06 PROCEDURE — 96372 THER/PROPH/DIAG INJ SC/IM: CPT | Mod: PBBFAC

## 2022-12-06 RX ADMIN — PENICILLIN G BENZATHINE 2.4 MILLION UNITS: 1200000 INJECTION, SUSPENSION INTRAMUSCULAR at 02:12

## 2022-12-06 NOTE — TELEPHONE ENCOUNTER
RPR titer increased to 16 dils.  Phoned pt.  Will retreat with Bicillin 2.4 mil units IM x 1 dose, last treated 5/22 with Bicillin 7.2 mil units IM for titer of 8 dils.  He has been sexually active with one partner, Hakeem, who is followed by a provider at [facility] per pt report.  Will notify Hakeem & have him seek treatment as well.  Condom use & abstinence until both complete treatment strongly encouraged. Jacob will come in today for treatment.

## 2022-12-06 NOTE — NURSING
Rocephin administed to left and right gluteal using aseptic technique. Patient observed for 15 minutes for reactions, none noted. Patient tolerated well.

## 2022-12-08 LAB
AGE: 32
CD3+CD4+ CELLS # BLD: 23 % (ref 28–48)
CD3+CD4+ CELLS # SPEC: 609.41 UNIT/L (ref 589–1505)
CD3+CD4+ CELLS NFR BLD: 41.4 %
HIV1 RNA # PLAS NAA DL=20: NORMAL COPIES/ML
LYMPHOCYTES # BLD AUTO: 1472 X10(3)/MCL (ref 1260–5520)
LYMPHOMA - T-CELL MARKERS SPEC-IMP: ABNORMAL
WBC # BLD AUTO: 6400 /MM3 (ref 4500–11500)

## 2023-04-18 ENCOUNTER — OFFICE VISIT (OUTPATIENT)
Dept: INFECTIOUS DISEASES | Facility: CLINIC | Age: 33
End: 2023-04-18
Payer: MEDICAID

## 2023-04-18 VITALS
TEMPERATURE: 98 F | HEART RATE: 70 BPM | DIASTOLIC BLOOD PRESSURE: 68 MMHG | BODY MASS INDEX: 23.94 KG/M2 | RESPIRATION RATE: 16 BRPM | WEIGHT: 171 LBS | HEIGHT: 71 IN | SYSTOLIC BLOOD PRESSURE: 115 MMHG

## 2023-04-18 DIAGNOSIS — F17.210 CIGARETTE NICOTINE DEPENDENCE WITHOUT COMPLICATION: ICD-10-CM

## 2023-04-18 DIAGNOSIS — F17.210 NICOTINE DEPENDENCE, CIGARETTES, UNCOMPLICATED: ICD-10-CM

## 2023-04-18 DIAGNOSIS — A60.9 HSV (HERPES SIMPLEX VIRUS) ANOGENITAL INFECTION: ICD-10-CM

## 2023-04-18 DIAGNOSIS — J30.89 NON-SEASONAL ALLERGIC RHINITIS, UNSPECIFIED TRIGGER: ICD-10-CM

## 2023-04-18 DIAGNOSIS — Z86.19 HISTORY OF SYPHILIS: ICD-10-CM

## 2023-04-18 DIAGNOSIS — G47.00 INSOMNIA, UNSPECIFIED TYPE: ICD-10-CM

## 2023-04-18 DIAGNOSIS — T78.40XS ALLERGY, SEQUELA: ICD-10-CM

## 2023-04-18 DIAGNOSIS — B20 HIV DISEASE: Primary | ICD-10-CM

## 2023-04-18 LAB
ALBUMIN SERPL-MCNC: 4.6 G/DL (ref 3.5–5)
ALBUMIN/GLOB SERPL: 1.5 RATIO (ref 1.1–2)
ALP SERPL-CCNC: 33 UNIT/L (ref 40–150)
ALT SERPL-CCNC: 35 UNIT/L (ref 0–55)
AST SERPL-CCNC: 33 UNIT/L (ref 5–34)
BASOPHILS # BLD AUTO: 0.04 X10(3)/MCL (ref 0–0.2)
BASOPHILS NFR BLD AUTO: 0.5 %
BILIRUBIN DIRECT+TOT PNL SERPL-MCNC: 0.4 MG/DL
BUN SERPL-MCNC: 29.3 MG/DL (ref 8.9–20.6)
CALCIUM SERPL-MCNC: 9.8 MG/DL (ref 8.4–10.2)
CHLORIDE SERPL-SCNC: 104 MMOL/L (ref 98–107)
CO2 SERPL-SCNC: 26 MMOL/L (ref 22–29)
CREAT SERPL-MCNC: 0.99 MG/DL (ref 0.73–1.18)
EOSINOPHIL # BLD AUTO: 0 X10(3)/MCL (ref 0–0.9)
EOSINOPHIL NFR BLD AUTO: 0 %
ERYTHROCYTE [DISTWIDTH] IN BLOOD BY AUTOMATED COUNT: 13.4 % (ref 11.5–17)
GFR SERPLBLD CREATININE-BSD FMLA CKD-EPI: >60 MLS/MIN/1.73/M2
GLOBULIN SER-MCNC: 3.1 GM/DL (ref 2.4–3.5)
GLUCOSE SERPL-MCNC: 73 MG/DL (ref 74–100)
HCT VFR BLD AUTO: 41.8 % (ref 42–52)
HGB BLD-MCNC: 13.9 G/DL (ref 14–18)
IMM GRANULOCYTES # BLD AUTO: 0.03 X10(3)/MCL (ref 0–0.04)
IMM GRANULOCYTES NFR BLD AUTO: 0.4 %
LYMPHOCYTES # BLD AUTO: 1.16 X10(3)/MCL (ref 0.6–4.6)
LYMPHOCYTES NFR BLD AUTO: 14.4 %
MCH RBC QN AUTO: 32.3 PG (ref 27–31)
MCHC RBC AUTO-ENTMCNC: 33.3 G/DL (ref 33–36)
MCV RBC AUTO: 97.2 FL (ref 80–94)
MONOCYTES # BLD AUTO: 0.81 X10(3)/MCL (ref 0.1–1.3)
MONOCYTES NFR BLD AUTO: 10.1 %
NEUTROPHILS # BLD AUTO: 5.99 X10(3)/MCL (ref 2.1–9.2)
NEUTROPHILS NFR BLD AUTO: 74.6 %
NRBC BLD AUTO-RTO: 0 %
PLATELET # BLD AUTO: 181 X10(3)/MCL (ref 130–400)
PMV BLD AUTO: 10.8 FL (ref 7.4–10.4)
POTASSIUM SERPL-SCNC: 4.5 MMOL/L (ref 3.5–5.1)
PROT SERPL-MCNC: 7.7 GM/DL (ref 6.4–8.3)
RBC # BLD AUTO: 4.3 X10(6)/MCL (ref 4.7–6.1)
RPR SER QL: REACTIVE
RPR SER-TITR: ABNORMAL {TITER}
SODIUM SERPL-SCNC: 140 MMOL/L (ref 136–145)
T PALLIDUM AB SER QL: REACTIVE
WBC # SPEC AUTO: 8 X10(3)/MCL (ref 4.5–11.5)

## 2023-04-18 PROCEDURE — 99214 PR OFFICE/OUTPT VISIT, EST, LEVL IV, 30-39 MIN: ICD-10-PCS | Mod: 25,S$PBB,, | Performed by: NURSE PRACTITIONER

## 2023-04-18 PROCEDURE — 86780 TREPONEMA PALLIDUM: CPT | Performed by: NURSE PRACTITIONER

## 2023-04-18 PROCEDURE — 1159F MED LIST DOCD IN RCRD: CPT | Mod: CPTII,,, | Performed by: NURSE PRACTITIONER

## 2023-04-18 PROCEDURE — 1159F PR MEDICATION LIST DOCUMENTED IN MEDICAL RECORD: ICD-10-PCS | Mod: CPTII,,, | Performed by: NURSE PRACTITIONER

## 2023-04-18 PROCEDURE — 99406 BEHAV CHNG SMOKING 3-10 MIN: CPT | Mod: S$PBB,,, | Performed by: NURSE PRACTITIONER

## 2023-04-18 PROCEDURE — 87536 HIV-1 QUANT&REVRSE TRNSCRPJ: CPT | Mod: 90 | Performed by: NURSE PRACTITIONER

## 2023-04-18 PROCEDURE — 3074F SYST BP LT 130 MM HG: CPT | Mod: CPTII,,, | Performed by: NURSE PRACTITIONER

## 2023-04-18 PROCEDURE — 3078F PR MOST RECENT DIASTOLIC BLOOD PRESSURE < 80 MM HG: ICD-10-PCS | Mod: CPTII,,, | Performed by: NURSE PRACTITIONER

## 2023-04-18 PROCEDURE — 3008F PR BODY MASS INDEX (BMI) DOCUMENTED: ICD-10-PCS | Mod: CPTII,,, | Performed by: NURSE PRACTITIONER

## 2023-04-18 PROCEDURE — 3074F PR MOST RECENT SYSTOLIC BLOOD PRESSURE < 130 MM HG: ICD-10-PCS | Mod: CPTII,,, | Performed by: NURSE PRACTITIONER

## 2023-04-18 PROCEDURE — 3008F BODY MASS INDEX DOCD: CPT | Mod: CPTII,,, | Performed by: NURSE PRACTITIONER

## 2023-04-18 PROCEDURE — 1160F PR REVIEW ALL MEDS BY PRESCRIBER/CLIN PHARMACIST DOCUMENTED: ICD-10-PCS | Mod: CPTII,,, | Performed by: NURSE PRACTITIONER

## 2023-04-18 PROCEDURE — 1160F RVW MEDS BY RX/DR IN RCRD: CPT | Mod: CPTII,,, | Performed by: NURSE PRACTITIONER

## 2023-04-18 PROCEDURE — 80053 COMPREHEN METABOLIC PANEL: CPT | Performed by: NURSE PRACTITIONER

## 2023-04-18 PROCEDURE — 99213 OFFICE O/P EST LOW 20 MIN: CPT | Mod: PBBFAC | Performed by: NURSE PRACTITIONER

## 2023-04-18 PROCEDURE — 85025 COMPLETE CBC W/AUTO DIFF WBC: CPT | Performed by: NURSE PRACTITIONER

## 2023-04-18 PROCEDURE — 36415 COLL VENOUS BLD VENIPUNCTURE: CPT | Performed by: NURSE PRACTITIONER

## 2023-04-18 PROCEDURE — 3078F DIAST BP <80 MM HG: CPT | Mod: CPTII,,, | Performed by: NURSE PRACTITIONER

## 2023-04-18 PROCEDURE — 99214 OFFICE O/P EST MOD 30 MIN: CPT | Mod: 25,S$PBB,, | Performed by: NURSE PRACTITIONER

## 2023-04-18 PROCEDURE — 99406 PR TOBACCO USE CESSATION INTERMEDIATE 3-10 MINUTES: ICD-10-PCS | Mod: S$PBB,,, | Performed by: NURSE PRACTITIONER

## 2023-04-18 PROCEDURE — 86592 SYPHILIS TEST NON-TREP QUAL: CPT | Performed by: NURSE PRACTITIONER

## 2023-04-18 RX ORDER — HYDROXYZINE PAMOATE 25 MG/1
25 CAPSULE ORAL NIGHTLY
Qty: 90 CAPSULE | Refills: 1 | Status: SHIPPED | OUTPATIENT
Start: 2023-04-18

## 2023-04-18 RX ORDER — VALACYCLOVIR HYDROCHLORIDE 1 G/1
1000 TABLET, FILM COATED ORAL 2 TIMES DAILY
Qty: 10 TABLET | Refills: 1 | Status: SHIPPED | OUTPATIENT
Start: 2023-04-18 | End: 2023-07-18 | Stop reason: SDUPTHER

## 2023-04-18 RX ORDER — VARENICLINE TARTRATE 1 MG/1
1 TABLET, FILM COATED ORAL 2 TIMES DAILY
Qty: 60 TABLET | Refills: 1 | Status: SHIPPED | OUTPATIENT
Start: 2023-04-18 | End: 2023-07-18

## 2023-04-18 RX ORDER — VARENICLINE TARTRATE 0.5 (11)-1
KIT ORAL
Qty: 1 EACH | Refills: 0 | Status: SHIPPED | OUTPATIENT
Start: 2023-04-18 | End: 2023-07-18

## 2023-04-18 RX ORDER — LORATADINE 10 MG/1
10 TABLET ORAL DAILY
Qty: 90 TABLET | Refills: 3 | Status: SHIPPED | OUTPATIENT
Start: 2023-04-18 | End: 2023-11-29 | Stop reason: SDUPTHER

## 2023-04-18 NOTE — PROGRESS NOTES
Subjective     Patient ID: Jacob Gee is a 32 y.o. male.    Chief Complaint: .Followup HIV (States keeps passing syphylis back and forth between fiance. Also, questions letter for meeting with person for medical marijuana)    4/18/23  Jacob is a 31 yo WM here today for HIV f/u visit.  He takes Genvoya daily & is virally suppressed.  Labs 12/5/22 VL UD, Cd4 609.  RPR titer increased to 16 dils & he did receive Bicillin 2.4 mil units IM.  He has not been sexually active since receiving treatment, will repeat titer today. He and Hakeem  10/31/22, Hakeem remains on Descovy for PrEP.  Hakeem was tested for syphilis & titer was unchanged at 4 dils per pt report.  He did not receive treatment, as his provider did not find it to be indicated.  Jacob tells me that they have not had any type of encounters since treatment.  He tells me that the hydroxyzine is effective for sleep, uses 1-2 PRN. Appreciates refills.  He states that he did receive the starting pack of Chantix, but was unable to get continuing packs from pharmacy.  He states that he was tolerating it well & had stopped smoking at 3 weeks but relapsed after he was not able to continue. Would like to try again & will get from [facility] pharmacy.  He has an 18 pk year history of smoking.  Requests refill on loratadine, states was not filled at [facility] & will  today as well.  Immunizations are UTD. All questions answered & concerns addressed.    12/5/22  Jacob is a 31 yo WM presenting today for HIV f/u visit. He is taking Genvoya daily and tolerates well with viral suppression.  Labs 8/22 VL <20, CD4 714.  Syphilis is serofast at 8 dils. In monogamous relationship with Hakeem.  Oral, anal, urine samples negative for ct/gc 8/2/22. HSV confirmed + to anogenital lesion last visit, responsive to PRN use of Valtrex.  Appreciates flu vaccine today. Anal pap NIL 8/2/22.  He tells me that he does not sleep well, trouble falling & staying asleep.  Has taken Elavil in the  past, but did not like the hangover effect. Amenable to trying Hydroxyzine for same. Will call me if refills are needed.  He remains clean, has gone back to smoking cigarettes.  Mood is stable, amenable to trying Chantix for cessation.  Potential side effects of Chantix discussed with pt, would like to try Chantix. Requesting refill on Loratadine, effective for allergies.  All questions answered & concerns addressed.      8/2/22  Jacob is a 33 yo WM presenting today for HIV f/u visit.  He reports 100% adherent to Genvoya, tolerates well with viral suppression.  Last labs 5/22 VL UD, CD4 693.  Will update labs today. RPR titer increased from 4 to 8 dils 5/10/22 & he received retreatment with Bicillin 7.2 mil units IM & has not been sexually active since 1/2022.  Last STI screening swabs for ct/gc collected 1/22 were negative, will repeat today as well.  He also has a single, tender vesicular lesion to right buttock between rectum & scrotum, presented 2-3 days ago.  He tells me that this is the sight of recurrent outbreak every so often, maybe a couple of times per year.  Will swab for HSV.  He appreciates same.  He did have a sinus infection about 3 weeks ago, negative COVID.  Symptoms were congestion, fever, body aches, diarrhea, and vomiting.  Again, he tells me that he has not been sexually active since 1/2022.  No oral lesions.  No penile lesions.  Due for repeat anal pap, will collect same today.  All questions answered & concerns address    Review of Systems   Constitutional: Negative.    HENT: Negative.     Respiratory: Negative.     Cardiovascular: Negative.    Gastrointestinal: Negative.    Genitourinary: Negative.    Integumentary:  Negative.   Neurological: Negative.    Hematological: Negative.    Psychiatric/Behavioral: Negative.          Objective     Physical Exam  Vitals reviewed.   Constitutional:       General: He is not in acute distress.     Appearance: Normal appearance. He is not toxic-appearing.    HENT:      Mouth/Throat:      Mouth: Mucous membranes are moist.      Pharynx: Oropharynx is clear.   Eyes:      Conjunctiva/sclera: Conjunctivae normal.   Cardiovascular:      Rate and Rhythm: Normal rate and regular rhythm.   Pulmonary:      Effort: Pulmonary effort is normal. No respiratory distress.      Breath sounds: Normal breath sounds.   Abdominal:      General: Abdomen is flat. Bowel sounds are normal.      Palpations: Abdomen is soft.   Musculoskeletal:         General: Normal range of motion.      Cervical back: Normal range of motion.   Lymphadenopathy:      Cervical: No cervical adenopathy.   Skin:     General: Skin is warm and dry.   Neurological:      General: No focal deficit present.      Mental Status: He is alert and oriented to person, place, and time. Mental status is at baseline.   Psychiatric:         Mood and Affect: Mood normal.         Behavior: Behavior normal.          Assessment and Plan     Problem List Items Addressed This Visit    None    HIV disease  -     elviteg-cob-emtri-tenof ALAFEN (GENVOYA) [phone] mg Tab; Take 1 tablet by mouth once daily.  Dispense: 90 tablet; Refill: 1  -     Comprehensive Metabolic Panel  -     CBC Auto Differential; Future; Expected date: 04/18/2023  -     HIV-1 RNA, Quantitative, PCR with Reflex to Genotype; Future; Expected date: 04/18/2023  Adherence and sexual health counseling done.  Use condoms for all sexual encounters.  Blood precautions.   Continue Genvoya as prescribed.  Labs today.  RTC 3 months with debbie Damico.     HIV Wellness:  Anal pap: 8/22 NIL  Oral CT/GC: 8/22 Neg  Anal CT/GC: 8/22 Neg  Urine CT/GC: 8/22 Neg  RPR: 8/22 1:8, 12/22 16 dils (treated), 4/23  Ophth: 6/21     History of syphilis  -     SYPHILIS ANTIBODY (WITH REFLEX RPR); Future; Expected date: 04/18/2023  Serofast at 4-8 dils.   Increased to 16 dils 12/22, retreated with Bicillin 2.4 mil units IM.   Repeat titer today.    Non-seasonal allergic rhinitis,  unspecified trigger  Loratadine 10 mg daily.    Insomnia, unspecified type  -     hydrOXYzine pamoate (VISTARIL) 25 MG Cap; Take 1 capsule (25 mg total) by mouth nightly.  Dispense: 90 capsule; Refill: 1  Hydroxyzine 25-50 mg po PRN at bedtime for sleep.     Cigarette nicotine dependence without complication  -     varenicline (CHANTIX STARTING MONTH BOX) 0.5 mg (11)- 1 mg (42) tablet; Take one 0.5mg tab by mouth once daily X3 days,then increase to one 0.5mg tab twice daily X4 days,then increase to one 1mg tab twice daily  Dispense: 1 each; Refill: 0  -     varenicline (CHANTIX) 1 mg Tab; Take 1 tablet (1 mg total) by mouth 2 (two) times daily.  Dispense: 60 tablet; Refill: 1  Chantix as prescribed.   ED precautions.   Discontinue if noted change in mood or behavior.  Smoking cessation encouraged.  Pt is ready to quit.   Discussed benefits of quitting to include but not limited to improved overall health, decreased cardiac/vascular/pulmonary/stroke risks, as well as financial benefits.  >3 min spent counseling.    HSV (herpes simplex virus) anogenital infection  -     valACYclovir (VALTREX) 1000 MG tablet; Take 1 tablet (1,000 mg total) by mouth 2 (two) times daily. for 5 days  Dispense: 10 tablet; Refill: 1  Valtrex PRN outbreak.    Nicotine dependence, cigarettes, uncomplicated  -     Ambulatory referral/consult to Smoking Cessation Program; Future; Expected date: 04/25/2023

## 2023-04-20 LAB — HIV1 RNA # PLAS NAA DL=20: NORMAL COPIES/ML

## 2023-07-18 ENCOUNTER — OFFICE VISIT (OUTPATIENT)
Dept: INFECTIOUS DISEASES | Facility: CLINIC | Age: 33
End: 2023-07-18
Payer: MEDICAID

## 2023-07-18 VITALS
SYSTOLIC BLOOD PRESSURE: 109 MMHG | HEIGHT: 71 IN | HEART RATE: 80 BPM | WEIGHT: 174.63 LBS | BODY MASS INDEX: 24.45 KG/M2 | TEMPERATURE: 98 F | DIASTOLIC BLOOD PRESSURE: 71 MMHG | RESPIRATION RATE: 20 BRPM

## 2023-07-18 DIAGNOSIS — F17.210 NICOTINE DEPENDENCE, CIGARETTES, UNCOMPLICATED: ICD-10-CM

## 2023-07-18 DIAGNOSIS — B20 HIV DISEASE: Primary | ICD-10-CM

## 2023-07-18 DIAGNOSIS — A60.9 HSV (HERPES SIMPLEX VIRUS) ANOGENITAL INFECTION: ICD-10-CM

## 2023-07-18 DIAGNOSIS — Z11.3 ROUTINE SCREENING FOR STI (SEXUALLY TRANSMITTED INFECTION): ICD-10-CM

## 2023-07-18 DIAGNOSIS — Z86.19 HISTORY OF SYPHILIS: ICD-10-CM

## 2023-07-18 LAB
ALBUMIN SERPL-MCNC: 4.2 G/DL (ref 3.5–5)
ALBUMIN/GLOB SERPL: 1.4 RATIO (ref 1.1–2)
ALP SERPL-CCNC: 35 UNIT/L (ref 40–150)
ALT SERPL-CCNC: 27 UNIT/L (ref 0–55)
APPEARANCE UR: CLEAR
AST SERPL-CCNC: 32 UNIT/L (ref 5–34)
BACTERIA #/AREA URNS AUTO: ABNORMAL /HPF
BASOPHILS # BLD AUTO: 0.05 X10(3)/MCL
BASOPHILS NFR BLD AUTO: 0.9 %
BILIRUB UR QL STRIP.AUTO: NEGATIVE
BILIRUBIN DIRECT+TOT PNL SERPL-MCNC: 0.2 MG/DL
BUN SERPL-MCNC: 20.8 MG/DL (ref 8.9–20.6)
C TRACH DNA SPEC QL NAA+PROBE: NOT DETECTED
C TRACH DNA SPEC QL NAA+PROBE: NOT DETECTED
CALCIUM SERPL-MCNC: 9.8 MG/DL (ref 8.4–10.2)
CHLORIDE SERPL-SCNC: 105 MMOL/L (ref 98–107)
CHOLEST SERPL-MCNC: 175 MG/DL
CHOLEST/HDLC SERPL: 3 {RATIO} (ref 0–5)
CO2 SERPL-SCNC: 28 MMOL/L (ref 22–29)
COLOR UR: ABNORMAL
CREAT SERPL-MCNC: 0.96 MG/DL (ref 0.73–1.18)
DEPRECATED CALCIDIOL+CALCIFEROL SERPL-MC: 49.1 NG/ML (ref 30–80)
EOSINOPHIL # BLD AUTO: 0.14 X10(3)/MCL (ref 0–0.9)
EOSINOPHIL NFR BLD AUTO: 2.6 %
ERYTHROCYTE [DISTWIDTH] IN BLOOD BY AUTOMATED COUNT: 13.6 % (ref 11.5–17)
EST. AVERAGE GLUCOSE BLD GHB EST-MCNC: 96.8 MG/DL
GFR SERPLBLD CREATININE-BSD FMLA CKD-EPI: >60 MLS/MIN/1.73/M2
GLOBULIN SER-MCNC: 3 GM/DL (ref 2.4–3.5)
GLUCOSE SERPL-MCNC: 92 MG/DL (ref 74–100)
GLUCOSE UR QL STRIP.AUTO: NORMAL
HBA1C MFR BLD: 5 %
HCT VFR BLD AUTO: 42.3 % (ref 42–52)
HCV AB SERPL QL IA: NONREACTIVE
HDLC SERPL-MCNC: 53 MG/DL (ref 35–60)
HGB BLD-MCNC: 14 G/DL (ref 14–18)
HYALINE CASTS #/AREA URNS LPF: ABNORMAL /LPF
IMM GRANULOCYTES # BLD AUTO: 0.02 X10(3)/MCL (ref 0–0.04)
IMM GRANULOCYTES NFR BLD AUTO: 0.4 %
KETONES UR QL STRIP.AUTO: NEGATIVE
LDLC SERPL CALC-MCNC: 110 MG/DL (ref 50–140)
LEUKOCYTE ESTERASE UR QL STRIP.AUTO: NEGATIVE
LYMPHOCYTES # BLD AUTO: 1.71 X10(3)/MCL (ref 0.6–4.6)
LYMPHOCYTES NFR BLD AUTO: 31.5 %
MCH RBC QN AUTO: 33.1 PG (ref 27–31)
MCHC RBC AUTO-ENTMCNC: 33.1 G/DL (ref 33–36)
MCV RBC AUTO: 100 FL (ref 80–94)
MONOCYTES # BLD AUTO: 0.68 X10(3)/MCL (ref 0.1–1.3)
MONOCYTES NFR BLD AUTO: 12.5 %
MUCOUS THREADS URNS QL MICRO: ABNORMAL /LPF
N GONORRHOEA DNA SPEC QL NAA+PROBE: NOT DETECTED
N GONORRHOEA DNA SPEC QL NAA+PROBE: NOT DETECTED
NEUTROPHILS # BLD AUTO: 2.83 X10(3)/MCL (ref 2.1–9.2)
NEUTROPHILS NFR BLD AUTO: 52.1 %
NITRITE UR QL STRIP.AUTO: NEGATIVE
NRBC BLD AUTO-RTO: 0 %
PH UR STRIP.AUTO: 6 [PH]
PLATELET # BLD AUTO: 200 X10(3)/MCL (ref 130–400)
PMV BLD AUTO: 10.4 FL (ref 7.4–10.4)
POTASSIUM SERPL-SCNC: 4.5 MMOL/L (ref 3.5–5.1)
PROT SERPL-MCNC: 7.2 GM/DL (ref 6.4–8.3)
PROT UR QL STRIP.AUTO: NEGATIVE
RBC # BLD AUTO: 4.23 X10(6)/MCL (ref 4.7–6.1)
RBC #/AREA URNS AUTO: ABNORMAL /HPF
RBC UR QL AUTO: NEGATIVE
RPR SER QL: REACTIVE
RPR SER-TITR: ABNORMAL {TITER}
SODIUM SERPL-SCNC: 139 MMOL/L (ref 136–145)
SOURCE (OHS): NORMAL
SOURCE (OHS): NORMAL
SP GR UR STRIP.AUTO: 1.02
SQUAMOUS #/AREA URNS LPF: ABNORMAL /HPF
T PALLIDUM AB SER QL: REACTIVE
TRIGL SERPL-MCNC: 60 MG/DL (ref 34–140)
TSH SERPL-ACNC: 1.56 UIU/ML (ref 0.35–4.94)
UROBILINOGEN UR STRIP-ACNC: NORMAL
VLDLC SERPL CALC-MCNC: 12 MG/DL
WBC # SPEC AUTO: 5.43 X10(3)/MCL (ref 4.5–11.5)
WBC #/AREA URNS AUTO: ABNORMAL /HPF

## 2023-07-18 PROCEDURE — 87591 N.GONORRHOEAE DNA AMP PROB: CPT | Performed by: NURSE PRACTITIONER

## 2023-07-18 PROCEDURE — 1159F MED LIST DOCD IN RCRD: CPT | Mod: CPTII,,, | Performed by: NURSE PRACTITIONER

## 2023-07-18 PROCEDURE — 99213 OFFICE O/P EST LOW 20 MIN: CPT | Mod: PBBFAC | Performed by: NURSE PRACTITIONER

## 2023-07-18 PROCEDURE — 3008F PR BODY MASS INDEX (BMI) DOCUMENTED: ICD-10-PCS | Mod: CPTII,,, | Performed by: NURSE PRACTITIONER

## 2023-07-18 PROCEDURE — 1160F RVW MEDS BY RX/DR IN RCRD: CPT | Mod: CPTII,,, | Performed by: NURSE PRACTITIONER

## 2023-07-18 PROCEDURE — 99214 OFFICE O/P EST MOD 30 MIN: CPT | Mod: 25,S$PBB,, | Performed by: NURSE PRACTITIONER

## 2023-07-18 PROCEDURE — 1160F PR REVIEW ALL MEDS BY PRESCRIBER/CLIN PHARMACIST DOCUMENTED: ICD-10-PCS | Mod: CPTII,,, | Performed by: NURSE PRACTITIONER

## 2023-07-18 PROCEDURE — 3074F SYST BP LT 130 MM HG: CPT | Mod: CPTII,,, | Performed by: NURSE PRACTITIONER

## 2023-07-18 PROCEDURE — 99406 BEHAV CHNG SMOKING 3-10 MIN: CPT | Mod: S$PBB,,, | Performed by: NURSE PRACTITIONER

## 2023-07-18 PROCEDURE — 3008F BODY MASS INDEX DOCD: CPT | Mod: CPTII,,, | Performed by: NURSE PRACTITIONER

## 2023-07-18 PROCEDURE — 99406 PR TOBACCO USE CESSATION INTERMEDIATE 3-10 MINUTES: ICD-10-PCS | Mod: S$PBB,,, | Performed by: NURSE PRACTITIONER

## 2023-07-18 PROCEDURE — 3078F PR MOST RECENT DIASTOLIC BLOOD PRESSURE < 80 MM HG: ICD-10-PCS | Mod: CPTII,,, | Performed by: NURSE PRACTITIONER

## 2023-07-18 PROCEDURE — 84443 ASSAY THYROID STIM HORMONE: CPT | Performed by: NURSE PRACTITIONER

## 2023-07-18 PROCEDURE — 86803 HEPATITIS C AB TEST: CPT | Performed by: NURSE PRACTITIONER

## 2023-07-18 PROCEDURE — 99214 PR OFFICE/OUTPT VISIT, EST, LEVL IV, 30-39 MIN: ICD-10-PCS | Mod: 25,S$PBB,, | Performed by: NURSE PRACTITIONER

## 2023-07-18 PROCEDURE — 80053 COMPREHEN METABOLIC PANEL: CPT | Performed by: NURSE PRACTITIONER

## 2023-07-18 PROCEDURE — 83036 HEMOGLOBIN GLYCOSYLATED A1C: CPT | Performed by: NURSE PRACTITIONER

## 2023-07-18 PROCEDURE — 82306 VITAMIN D 25 HYDROXY: CPT | Performed by: NURSE PRACTITIONER

## 2023-07-18 PROCEDURE — 87491 CHLMYD TRACH DNA AMP PROBE: CPT | Performed by: NURSE PRACTITIONER

## 2023-07-18 PROCEDURE — 3074F PR MOST RECENT SYSTOLIC BLOOD PRESSURE < 130 MM HG: ICD-10-PCS | Mod: CPTII,,, | Performed by: NURSE PRACTITIONER

## 2023-07-18 PROCEDURE — 3078F DIAST BP <80 MM HG: CPT | Mod: CPTII,,, | Performed by: NURSE PRACTITIONER

## 2023-07-18 PROCEDURE — 85025 COMPLETE CBC W/AUTO DIFF WBC: CPT | Performed by: NURSE PRACTITIONER

## 2023-07-18 PROCEDURE — 81001 URINALYSIS AUTO W/SCOPE: CPT | Performed by: NURSE PRACTITIONER

## 2023-07-18 PROCEDURE — 87536 HIV-1 QUANT&REVRSE TRNSCRPJ: CPT | Performed by: NURSE PRACTITIONER

## 2023-07-18 PROCEDURE — 86360 T CELL ABSOLUTE COUNT/RATIO: CPT | Performed by: NURSE PRACTITIONER

## 2023-07-18 PROCEDURE — 86780 TREPONEMA PALLIDUM: CPT | Performed by: NURSE PRACTITIONER

## 2023-07-18 PROCEDURE — 86592 SYPHILIS TEST NON-TREP QUAL: CPT | Performed by: NURSE PRACTITIONER

## 2023-07-18 PROCEDURE — 1159F PR MEDICATION LIST DOCUMENTED IN MEDICAL RECORD: ICD-10-PCS | Mod: CPTII,,, | Performed by: NURSE PRACTITIONER

## 2023-07-18 PROCEDURE — 36415 COLL VENOUS BLD VENIPUNCTURE: CPT | Performed by: NURSE PRACTITIONER

## 2023-07-18 PROCEDURE — 80061 LIPID PANEL: CPT | Performed by: NURSE PRACTITIONER

## 2023-07-18 PROCEDURE — 86480 TB TEST CELL IMMUN MEASURE: CPT | Performed by: NURSE PRACTITIONER

## 2023-07-18 RX ORDER — VALACYCLOVIR HYDROCHLORIDE 1 G/1
1000 TABLET, FILM COATED ORAL 2 TIMES DAILY
Qty: 30 TABLET | Refills: 0 | Status: SHIPPED | OUTPATIENT
Start: 2023-07-18 | End: 2024-03-12 | Stop reason: SDUPTHER

## 2023-07-18 NOTE — PROGRESS NOTES
Subjective     Patient ID: Jacob Gee is a 33 y.o. male.    Chief Complaint: Followup HIV (Denies problems)    7/18/23  Jacob is a 34 yo WM here today for HIV f/u visit.  He is taking Genvoya daily, tolerates well with viral suppression.  Labs 4/18/23 VL UD, renal function & liver enzymes stable, 12/22 CD4 609.  RPR titer decreased to 8 dils 4/23, will repeat today. Did have to use Valtrex, appreciates refill today. Rarely uses hydroxyzine, no refill needed at this time. Remains monogamous with Hakeem.  Last routine STI screenings 1 year ago, amenable to routine screenings today. Has resumed vaping, tells me that his grandmother is ill & it offers him some respite.  Will revisit cessation in the future.  All questions answered & concerns addressed.    4/18/23  Jacob is a 31 yo WM here today for HIV f/u visit.  He takes Genvoya daily & is virally suppressed.  Labs 12/5/22 VL UD, Cd4 609.  RPR titer increased to 16 dils & he did receive Bicillin 2.4 mil units IM.  He has not been sexually active since receiving treatment, will repeat titer today. He and Hakeem  10/31/22, Hakeem remains on Descovy for PrEP.  Hakeem was tested for syphilis & titer was unchanged at 4 dils per pt report.  He did not receive treatment, as his provider did not find it to be indicated.  Jacob tells me that they have not had any type of encounters since treatment.  He tells me that the hydroxyzine is effective for sleep, uses 1-2 PRN. Appreciates refills.  He states that he did receive the starting pack of Chantix, but was unable to get continuing packs from pharmacy.  He states that he was tolerating it well & had stopped smoking at 3 weeks but relapsed after he was not able to continue. Would like to try again & will get from [facility] pharmacy.  He has an 18 pk year history of smoking.  Requests refill on loratadine, states was not filled at [facility] & will  today as well.  Immunizations are UTD. All questions answered & concerns  addressed.     12/5/22  Jacob is a 33 yo WM presenting today for HIV f/u visit. He is taking Genvoya daily and tolerates well with viral suppression.  Labs 8/22 VL <20, CD4 714.  Syphilis is serofast at 8 dils. In monogamous relationship with Hakeem.  Oral, anal, urine samples negative for ct/gc 8/2/22. HSV confirmed + to anogenital lesion last visit, responsive to PRN use of Valtrex.  Appreciates flu vaccine today. Anal pap NIL 8/2/22.  He tells me that he does not sleep well, trouble falling & staying asleep.  Has taken Elavil in the past, but did not like the hangover effect. Amenable to trying Hydroxyzine for same. Will call me if refills are needed.  He remains clean, has gone back to smoking cigarettes.  Mood is stable, amenable to trying Chantix for cessation.  Potential side effects of Chantix discussed with pt, would like to try Chantix. Requesting refill on Loratadine, effective for allergies.  All questions answered & concerns addressed.     Review of Systems   Constitutional: Negative.    HENT: Negative.     Respiratory: Negative.     Cardiovascular: Negative.    Gastrointestinal: Negative.    Genitourinary: Negative.    Integumentary:  Negative.   Neurological: Negative.    Hematological: Negative.    Psychiatric/Behavioral: Negative.          Objective     Physical Exam  Vitals reviewed.   Constitutional:       General: He is not in acute distress.     Appearance: Normal appearance. He is not toxic-appearing.   HENT:      Mouth/Throat:      Mouth: Mucous membranes are moist.      Pharynx: Oropharynx is clear.   Eyes:      Conjunctiva/sclera: Conjunctivae normal.   Cardiovascular:      Rate and Rhythm: Normal rate and regular rhythm.   Pulmonary:      Effort: Pulmonary effort is normal. No respiratory distress.      Breath sounds: Normal breath sounds.   Abdominal:      General: Abdomen is flat. Bowel sounds are normal.      Palpations: Abdomen is soft.   Musculoskeletal:         General: Normal range of  motion.      Cervical back: Normal range of motion.   Lymphadenopathy:      Cervical: No cervical adenopathy.   Skin:     General: Skin is warm and dry.   Neurological:      General: No focal deficit present.      Mental Status: He is alert and oriented to person, place, and time. Mental status is at baseline.   Psychiatric:         Mood and Affect: Mood normal.         Behavior: Behavior normal.          Assessment and Plan     1. HIV disease  -     Quantiferon Gold TB; Future; Expected date: 07/18/2023  -     Hemoglobin A1C; Future; Expected date: 07/18/2023  -     Vitamin D; Future; Expected date: 07/18/2023  -     TSH; Future; Expected date: 07/18/2023  -     Lipid Panel; Future; Expected date: 07/18/2023  -     Urinalysis  -     Comprehensive Metabolic Panel  -     CBC Auto Differential; Future; Expected date: 07/18/2023  -     CD4 Lymphocytes; Future; Expected date: 07/18/2023  -     HIV-1 RNA, Quantitative, PCR with Reflex to Genotype; Future; Expected date: 07/18/2023  -     elviteg-cob-emtri-tenof ALAFEN (GENVOYA) [phone] mg Tab; Take 1 tablet by mouth once daily.  Dispense: 90 tablet; Refill: 1  Adherence and sexual health counseling done.  Use condoms for all sexual encounters.  Blood precautions.   Continue Genvoya as prescribed.  Labs today.  RTC 3 months with debbie Damico.     HIV Wellness:  Anal pap: 8/22 NIL  Oral CT/GC: 8/22 Neg, 7/23  Anal CT/GC: 8/22 Neg, 7/23  Urine CT/GC: 8/22 Neg, 7/23  RPR: 8/22 1:8, 12/22 16 dils (treated), 4/23 8 dils, 7/23  Ophth: 6/21     2. History of syphilis  -     SYPHILIS ANTIBODY (WITH REFLEX RPR); Future; Expected date: 07/18/2023  Serofast at 4-8 dils.   Increased to 16 dils 12/22, retreated with Bicillin 2.4 mil units IM.   4/23 8 dils.   Repeat titer today.    3. Routine screening for STI (sexually transmitted infection)  -     Hepatitis C Antibody; Future; Expected date: 07/18/2023  -     Chlamydia/GC, PCR  -     C.trach/N.gonor AMP RNA; Future;  Expected date: 07/18/2023  -     Chlamydia/GC, PCR  CT/GC 3 pt screening, Hep C ab today.     4. Nicotine dependence, cigarettes, uncomplicated  Smoking cessation encouraged.  Pt is not ready to quit at this time.  Discussed benefits of quitting to include but not limited to improved overall health, decreased cardiac/vascular/pulmonary/stroke risks, as well as financial benefits.  3-10 minutes spent counseling on cessation efforts.    5. HSV (herpes simplex virus) anogenital infection  -     valACYclovir (VALTREX) 1000 MG tablet; Take 1 tablet (1,000 mg total) by mouth 2 (two) times daily.  Dispense: 30 tablet; Refill: 0  Valtrex PRN outbreak.

## 2023-07-20 LAB
C TRACH RRNA SPEC QL NAA+PROBE: NEGATIVE
CD3 CELLS # BLD: 1213 CELLS/MCL (ref 550–2202)
CD3 CELLS NFR BLD: 85 % (ref 58–86)
CD3+CD4+ CELLS # BLD: 674 CELLS/MCL (ref 365–1437)
CD3+CD4+ CELLS NFR BLD: 47 % (ref 32–64)
CD3+CD4+ CELLS/CD3+CD8+ CLL BLD: 1.2 %
CD3+CD8+ CELLS # BLD: 554 CELLS/MCL (ref 171–846)
CD3+CD8+ CELLS NFR BLD: 39 % (ref 15–40)
CD45 CELLS # BLD: 1.43 THOU/MCL (ref 0.82–2.84)
GAMMA INTERFERON BACKGROUND BLD IA-ACNC: 0.05 IU/ML
HIV1 RNA # PLAS NAA DL=20: NORMAL COPIES/ML
M TB IFN-G BLD-IMP: NEGATIVE
M TB IFN-G CD4+ BCKGRND COR BLD-ACNC: -0.01 IU/ML
M TB IFN-G CD4+CD8+ BCKGRND COR BLD-ACNC: -0.02 IU/ML
MITOGEN IGNF BCKGRD COR BLD-ACNC: 9.54 IU/ML
N GONORRHOEA RRNA SPEC QL NAA+PROBE: NEGATIVE
SPECIMEN SOURCE: NORMAL
SPECIMEN SOURCE: NORMAL

## 2023-11-14 ENCOUNTER — OFFICE VISIT (OUTPATIENT)
Dept: INFECTIOUS DISEASES | Facility: CLINIC | Age: 33
End: 2023-11-14
Payer: COMMERCIAL

## 2023-11-14 ENCOUNTER — CLINICAL SUPPORT (OUTPATIENT)
Dept: INFECTIOUS DISEASES | Facility: CLINIC | Age: 33
End: 2023-11-14
Payer: COMMERCIAL

## 2023-11-14 DIAGNOSIS — B20 HIV DISEASE: Primary | ICD-10-CM

## 2023-11-14 DIAGNOSIS — Z86.19 HISTORY OF SYPHILIS: ICD-10-CM

## 2023-11-14 DIAGNOSIS — E55.9 VITAMIN D DEFICIENCY: ICD-10-CM

## 2023-11-14 DIAGNOSIS — B20 HIV DISEASE: ICD-10-CM

## 2023-11-14 DIAGNOSIS — Z23 NEED FOR VACCINATION: ICD-10-CM

## 2023-11-14 PROCEDURE — 1160F PR REVIEW ALL MEDS BY PRESCRIBER/CLIN PHARMACIST DOCUMENTED: ICD-10-PCS | Mod: CPTII,95,, | Performed by: NURSE PRACTITIONER

## 2023-11-14 PROCEDURE — 87536 HIV-1 QUANT&REVRSE TRNSCRPJ: CPT

## 2023-11-14 PROCEDURE — 90686 IIV4 VACC NO PRSV 0.5 ML IM: CPT | Mod: PBBFAC

## 2023-11-14 PROCEDURE — 36415 COLL VENOUS BLD VENIPUNCTURE: CPT

## 2023-11-14 PROCEDURE — 1159F PR MEDICATION LIST DOCUMENTED IN MEDICAL RECORD: ICD-10-PCS | Mod: CPTII,95,, | Performed by: NURSE PRACTITIONER

## 2023-11-14 PROCEDURE — 1159F MED LIST DOCD IN RCRD: CPT | Mod: CPTII,95,, | Performed by: NURSE PRACTITIONER

## 2023-11-14 PROCEDURE — 3044F HG A1C LEVEL LT 7.0%: CPT | Mod: CPTII,95,, | Performed by: NURSE PRACTITIONER

## 2023-11-14 PROCEDURE — 90471 IMMUNIZATION ADMIN: CPT | Mod: PBBFAC

## 2023-11-14 PROCEDURE — 1160F RVW MEDS BY RX/DR IN RCRD: CPT | Mod: CPTII,95,, | Performed by: NURSE PRACTITIONER

## 2023-11-14 PROCEDURE — 3044F PR MOST RECENT HEMOGLOBIN A1C LEVEL <7.0%: ICD-10-PCS | Mod: CPTII,95,, | Performed by: NURSE PRACTITIONER

## 2023-11-14 PROCEDURE — 99211 OFF/OP EST MAY X REQ PHY/QHP: CPT | Mod: 25,PBBFAC

## 2023-11-14 PROCEDURE — 99214 OFFICE O/P EST MOD 30 MIN: CPT | Mod: 95,,, | Performed by: NURSE PRACTITIONER

## 2023-11-14 PROCEDURE — 99214 PR OFFICE/OUTPT VISIT, EST, LEVL IV, 30-39 MIN: ICD-10-PCS | Mod: 95,,, | Performed by: NURSE PRACTITIONER

## 2023-11-14 RX ADMIN — INFLUENZA VIRUS VACCINE 0.5 ML: 15; 15; 15; 15 SUSPENSION INTRAMUSCULAR at 11:11

## 2023-11-14 NOTE — PROGRESS NOTES
Patient ID: Jacob Gee 33 y.o.     Chief Complaint:   Chief Complaint   Patient presents with    Followup HIV     Denies problems        HPI:        11/14/23  Jacob is a 34 yo WM evaluated today via audio-video virtual visit for HIV f/u.  He is doing well on Genvoya daily, virally suppressed & tolerates it well. Labs 7/23 VL UD, CD4 674.  He has a history of syphilis, titer decreased to 4 dils.  3 point ct/gc screenings 7/23 negative.  He remains monogamous with Hakeem & they were  this summer. He appreciates flu vaccine, will come in today for vaccination & labs in clinic.  He denies any recent outbreaks, has valacyclovir on hand.  Vitamin D level stable, on OTC supplement. All questions answered & concerns addressed.    7/18/23  Jacob is a 34 yo WM here today for HIV f/u visit.  He is taking Genvoya daily, tolerates well with viral suppression.  Labs 4/18/23 VL UD, renal function & liver enzymes stable, 12/22 CD4 609.  RPR titer decreased to 8 dils 4/23, will repeat today. Did have to use Valtrex, appreciates refill today. Rarely uses hydroxyzine, no refill needed at this time. Remains monogamous with Hakeem.  Last routine STI screenings 1 year ago, amenable to routine screenings today. Has resumed vaping, tells me that his grandmother is ill & it offers him some respite.  Will revisit cessation in the future.  All questions answered & concerns addressed.     4/18/23  Jacob is a 33 yo WM here today for HIV f/u visit.  He takes Genvoya daily & is virally suppressed.  Labs 12/5/22 VL UD, Cd4 609.  RPR titer increased to 16 dils & he did receive Bicillin 2.4 mil units IM.  He has not been sexually active since receiving treatment, will repeat titer today. He and Hakeem  10/31/22, Hakeem remains on Descovy for PrEP.  Hakeem was tested for syphilis & titer was unchanged at 4 dils per pt report.  He did not receive treatment, as his provider did not find it to be indicated.  Jacob tells me that they have not had  any type of encounters since treatment.  He tells me that the hydroxyzine is effective for sleep, uses 1-2 PRN. Appreciates refills.  He states that he did receive the starting pack of Chantix, but was unable to get continuing packs from pharmacy.  He states that he was tolerating it well & had stopped smoking at 3 weeks but relapsed after he was not able to continue. Would like to try again & will get from [facility] pharmacy.  He has an 18 pk year history of smoking.  Requests refill on loratadine, states was not filled at [facility] & will  today as well.  Immunizations are UTD. All questions answered & concerns addressed.           Past Medical History:   Diagnosis Date    Allergy     HIV infection         History reviewed. No pertinent surgical history.     Social History     Socioeconomic History    Marital status: Single   Tobacco Use    Smoking status: Every Day     Types: Vaping with nicotine    Smokeless tobacco: Never    Tobacco comments:     5% nicotine daily   Substance and Sexual Activity    Alcohol use: Not Currently    Drug use: Not Currently    Sexual activity: Yes     Partners: Male     Birth control/protection: None     Comment: /Monogomous   Social History Narrative    ** Merged History Encounter **             Family History   Problem Relation Age of Onset    No Known Problems Mother     No Known Problems Father     No Known Problems Sister     No Known Problems Brother         Review of patient's allergies indicates:  No Known Allergies     Immunization History   Administered Date(s) Administered    COVID-19, MRNA, LN-S, PF (Pfizer) (Purple Cap) 03/20/2021, 04/10/2021, 11/29/2021    COVID-19, mRNA, LNP-S, bivalent booster, PF (PFIZER OMICRON) 03/22/2023    DTP 1990, 1990, 07/25/1991, 04/02/1992, 05/04/1995    HIB 1990, 07/25/1991, 04/02/1992    HPV 9-Valent 03/01/2017, 06/06/2017, 06/06/2017, 02/14/2018    Hepatitis A, Adult 07/17/2015    Hepatitis B, Pediatric/Adolescent  02/08/2008, 03/07/2008, 06/27/2008    Influenza 02/14/2018    Influenza (FLUBLOK) - Quadrivalent - Recombinant - PF *Preferred* (egg allergy) 02/14/2018    Influenza - Intranasal - Trivalent 10/22/2008    Influenza - Quadrivalent 12/28/2016, 10/07/2020    Influenza - Quadrivalent - MDCK - PF 11/29/2021    Influenza - Quadrivalent - PF (6-35 months) 02/14/2019    Influenza - Quadrivalent - PF *Preferred* (6 months and older) 10/07/2020, 12/05/2022    Influenza - Trivalent - PF (ADULT) 10/10/2019, 11/29/2021    MMR 04/02/1992, 05/04/1995    Meningococcal Conjugate (MCV4P) 02/08/2008, 08/29/2018, 02/14/2019    OPV 1990, 1990, 04/02/1992, 05/04/1995    Pneumococcal Conjugate - 13 Valent 07/17/2015    Pneumococcal Polysaccharide - 23 Valent 09/28/2015, 02/01/2021    Tdap 02/08/2008, 09/28/2015        Review of Systems   Constitutional: Negative.    HENT: Negative.     Eyes: Negative.    Respiratory: Negative.     Cardiovascular: Negative.    Gastrointestinal: Negative.    Genitourinary: Negative.    Musculoskeletal: Negative.    Skin: Negative.    Neurological: Negative.    Endo/Heme/Allergies: Negative.    Psychiatric/Behavioral: Negative.     All other systems reviewed and are negative.         Objective:      There were no vitals taken for this visit.     Physical Exam  Constitutional:       General: He is not in acute distress.     Appearance: Normal appearance.   HENT:      Head: Normocephalic.   Eyes:      Conjunctiva/sclera: Conjunctivae normal.   Pulmonary:      Effort: Pulmonary effort is normal.   Musculoskeletal:         General: Normal range of motion.      Cervical back: Normal range of motion.   Neurological:      General: No focal deficit present.      Mental Status: He is alert and oriented to person, place, and time. Mental status is at baseline.   Psychiatric:         Mood and Affect: Mood normal.         Behavior: Behavior normal.         Thought Content: Thought content normal.          Judgment: Judgment normal.          Labs:   Lab Results   Component Value Date    WBC 5.43 07/18/2023    HGB 14.0 07/18/2023    HCT 42.3 07/18/2023    .0 (H) 07/18/2023     07/18/2023       CMP  Sodium Level   Date Value Ref Range Status   07/18/2023 139 136 - 145 mmol/L Final     Potassium Level   Date Value Ref Range Status   07/18/2023 4.5 3.5 - 5.1 mmol/L Final     Carbon Dioxide   Date Value Ref Range Status   07/18/2023 28 22 - 29 mmol/L Final     Blood Urea Nitrogen   Date Value Ref Range Status   07/18/2023 20.8 (H) 8.9 - 20.6 mg/dL Final     Creatinine   Date Value Ref Range Status   07/18/2023 0.96 0.73 - 1.18 mg/dL Final     Calcium Level Total   Date Value Ref Range Status   07/18/2023 9.8 8.4 - 10.2 mg/dL Final     Albumin Level   Date Value Ref Range Status   07/18/2023 4.2 3.5 - 5.0 g/dL Final     Bilirubin Total   Date Value Ref Range Status   07/18/2023 0.2 <=1.5 mg/dL Final     Alkaline Phosphatase   Date Value Ref Range Status   07/18/2023 35 (L) 40 - 150 unit/L Final     Aspartate Aminotransferase   Date Value Ref Range Status   07/18/2023 32 5 - 34 unit/L Final     Alanine Aminotransferase   Date Value Ref Range Status   07/18/2023 27 0 - 55 unit/L Final     eGFR   Date Value Ref Range Status   07/18/2023 >60 mls/min/1.73/m2 Final     Lab Results   Component Value Date    TSH 1.563 07/18/2023     Hep C Ab Interp   Date Value Ref Range Status   07/18/2023 Nonreactive Nonreactive Final     Syphilis Antibody   Date Value Ref Range Status   07/18/2023 Reactive (A) Nonreactive, Equivocal Final     RPR   Date Value Ref Range Status   07/18/2023 Reactive (A) Non-Reactive Final     RPR Titer   Date Value Ref Range Status   07/18/2023 4 dils (A) (none) Final     Cholesterol Total   Date Value Ref Range Status   07/18/2023 175 <=200 mg/dL Final     HDL Cholesterol   Date Value Ref Range Status   07/18/2023 53 35 - 60 mg/dL Final     Triglyceride   Date Value Ref Range Status   07/18/2023 60  34 - 140 mg/dL Final     Cholesterol/HDL Ratio   Date Value Ref Range Status   07/18/2023 3 0 - 5 Final     Very Low Density Lipoprotein   Date Value Ref Range Status   07/18/2023 12  Final     LDL Cholesterol   Date Value Ref Range Status   07/18/2023 110.00 50.00 - 140.00 mg/dL Final     Vit D 25 OH   Date Value Ref Range Status   07/18/2023 49.1 30.0 - 80.0 ng/mL Final     Results for orders placed or performed in visit on 12/05/22   CD4 Lymphocytes   Result Value Ref Range    Patient Age 32     WBC Absolute 6,400 4,500 - 11,500 /mm3    Lymph Percent 23 (L) 28 - 48 %    Lymph Absolute 1,472 1,260 - 5,520 x10(3)/mcL    CD4 % 41.4 %    CD4 Absolute 609.408 589 - 1,505 unit/L    T Cell Interp       The absolute count and percentage of T-helper/inducer cells are both normal.    Dr. Cresencio Fowler     Results for orders placed or performed in visit on 07/18/23   HIV-1 RNA, Quantitative, PCR with Reflex to Genotype   Result Value Ref Range    HIV-1 RNA Detect/Quant, P Undetected Undetected copies/mL     Results for orders placed or performed in visit on 07/18/23   Quantiferon Gold TB   Result Value Ref Range    QuantiFERON-Tb Gold Plus Result Negative Negative    TB1 Ag minus Nil Result -0.01 IU/mL    TB2 Ag minus Nil Result -0.02 IU/mL    Mitogen minus Nil Result 9.54 IU/mL    Nil Result 0.05 IU/mL     Results for orders placed or performed in visit on 07/18/23   C.trach/N.gonor AMP RNA   Result Value Ref Range    Chlamydia trachomatis amplified RNA Negative Negative    Neisseria gonorrhoeae amplified RNA Negative Negative    Source oral/throat     SOURCE: oral/throat      Results for orders placed or performed in visit on 07/18/23   Urinalysis   Result Value Ref Range    Color, UA Light-Yellow Yellow, Light-Yellow, Dark Yellow, Zo, Straw    Appearance, UA Clear Clear    Specific Gravity, UA 1.016     pH, UA 6.0 5.0 - 8.5    Protein, UA Negative Negative    Glucose, UA Normal Negative, Normal    Ketones, UA Negative  Negative    Blood, UA Negative Negative    Bilirubin, UA Negative Negative    Urobilinogen, UA Normal 0.2, 1.0, Normal    Nitrites, UA Negative Negative    Leukocyte Esterase, UA Negative Negative    WBC, UA None Seen None Seen, 0-2, 3-5, 0-5 /HPF    Bacteria, UA None Seen None Seen /HPF    Squamous Epithelial Cells, UA Trace (A) None Seen /HPF    Mucous, UA Trace (A) None Seen /LPF    Hyaline Casts, UA None Seen None Seen /lpf    RBC, UA 0-5 None Seen, 0-2, 3-5, 0-5 /HPF       Imaging: Reviewed most recent relevant imaging studies available, notable results highlighted in this note    Medications:     Current Outpatient Medications   Medication Instructions    cholecalciferol, vitamin D3, (VITAMIN D3) 50 mcg (2,000 unit) Cap capsule Oral, Daily    elviteg-cob-emtri-tenof ALAFEN (GENVOYA) [phone] mg Tab 1 tablet, Oral, Daily    hydrOXYzine pamoate (VISTARIL) 25 mg, Oral, Nightly    loratadine (CLARITIN) 10 mg, Oral, Daily    multivitamin (THERAGRAN) per tablet 1 tablet, Oral, Daily    valACYclovir (VALTREX) 1,000 mg, Oral, 2 times daily       Assessment:       Problem List Items Addressed This Visit          ID    HIV disease - Primary    Relevant Medications    elviteg-cob-emtri-tenof ALAFEN (GENVOYA) [phone] mg Tab    Other Relevant Orders    Comprehensive Metabolic Panel    HIV-1 RNA, Quantitative, PCR with Reflex to Genotype    History of syphilis       Endocrine    Vitamin D deficiency     Other Visit Diagnoses       Need for vaccination        Relevant Medications    influenza (QUADRIVALENT PF) vaccine 0.5 mL (Start on 11/14/2023  8:00 AM)               Plan:      HIV disease  -     elviteg-cob-emtri-tenof ALAFEN (GENVOYA) [phone] mg Tab; Take 1 tablet by mouth once daily.  Dispense: 90 tablet; Refill: 1  -     Comprehensive Metabolic Panel  -     HIV-1 RNA, Quantitative, PCR with Reflex to Genotype; Future; Expected date: 11/14/2023  Adherence and sexual health counseling done.  Use  condoms for all sexual encounters.  Blood precautions.   Continue Genvoya as prescribed.  Labs today.  RTC 3 months with Margaux, in clinic.     HIV Wellness:  Anal pap: 8/22 NIL  Oral CT/GC: 8/22 Neg, 7/23 Neg  Anal CT/GC: 8/22 Neg, 7/23 Neg  Urine CT/GC: 8/22 Neg, 7/23 Neg  RPR: 8/22 1:8, 12/22 16 dils (treated), 4/23 8 dils, 7/23 4 dils  Ophth: 6/21     Need for vaccination  -     influenza (QUADRIVALENT PF) vaccine 0.5 mL  Will come in for flu vaccine today.     History of syphilis  Serofast at 4-8 dils.   Increased to 16 dils 12/22, retreated with Bicillin 2.4 mil units IM.   4/23 8 dils, 7/23 4 dils.    Vitamin D deficiency  Controlled.   Continue OTC vitamin d3 daily.      HSV (herpes simplex virus) anogenital infection  Valtrex PRN outbreak.       Patient and provider are located in the [facility].    Face to Face time with patient:  30 minutes of total time spent on the encounter, which includes face to face time and non-face to face time preparing to see the patient (eg, review of tests), Obtaining and/or reviewing separately obtained history, Documenting clinical information in the electronic or other health record, Independently interpreting results (not separately reported) and communicating results to the patient/family/caregiver, or Care coordination (not separately reported).     Each patient to whom he or she provides medical services by telemedicine is:  (1) informed of the relationship between the physician and patient and the respective role of any other health care provider with respect to management of the patient; and (2) notified that he or she may decline to receive medical services by telemedicine and may withdraw from such care at any time.

## 2023-11-16 LAB — HIV1 RNA # PLAS NAA DL=20: NORMAL COPIES/ML

## 2023-11-29 DIAGNOSIS — T78.40XS ALLERGY, SEQUELA: ICD-10-CM

## 2023-11-29 RX ORDER — LORATADINE 10 MG/1
10 TABLET ORAL DAILY
Qty: 90 TABLET | Refills: 3 | Status: SHIPPED | OUTPATIENT
Start: 2023-11-29 | End: 2024-03-06 | Stop reason: SDUPTHER

## 2024-03-06 DIAGNOSIS — T78.40XS ALLERGY, SEQUELA: ICD-10-CM

## 2024-03-06 RX ORDER — LORATADINE 10 MG/1
10 TABLET ORAL DAILY
Qty: 90 TABLET | Refills: 3 | Status: SHIPPED | OUTPATIENT
Start: 2024-03-06 | End: 2024-06-09 | Stop reason: SDUPTHER

## 2024-03-06 NOTE — TELEPHONE ENCOUNTER
Last visit with Margaux Hardy, APRN: 11/14/2023  Last visit in [facility] INFECTIOUS DISEASE: 11/14/2023    Patient's next visit in [facility] INFECTIOUS DISEASE: 3/12/2024     LL 07/19/2023    Please advise on medication refill

## 2024-03-12 ENCOUNTER — OFFICE VISIT (OUTPATIENT)
Dept: INFECTIOUS DISEASES | Facility: CLINIC | Age: 34
End: 2024-03-12
Payer: COMMERCIAL

## 2024-03-12 DIAGNOSIS — Z86.19 HISTORY OF SYPHILIS: ICD-10-CM

## 2024-03-12 DIAGNOSIS — B20 HIV DISEASE: Primary | ICD-10-CM

## 2024-03-12 DIAGNOSIS — A60.9 HSV (HERPES SIMPLEX VIRUS) ANOGENITAL INFECTION: ICD-10-CM

## 2024-03-12 PROCEDURE — 1160F RVW MEDS BY RX/DR IN RCRD: CPT | Mod: CPTII,95,, | Performed by: NURSE PRACTITIONER

## 2024-03-12 PROCEDURE — 1159F MED LIST DOCD IN RCRD: CPT | Mod: CPTII,95,, | Performed by: NURSE PRACTITIONER

## 2024-03-12 PROCEDURE — 99214 OFFICE O/P EST MOD 30 MIN: CPT | Mod: 95,,, | Performed by: NURSE PRACTITIONER

## 2024-03-12 RX ORDER — BICTEGRAVIR SODIUM, EMTRICITABINE, AND TENOFOVIR ALAFENAMIDE FUMARATE 50; 200; 25 MG/1; MG/1; MG/1
1 TABLET ORAL DAILY
Qty: 30 TABLET | Refills: 3 | Status: SHIPPED | OUTPATIENT
Start: 2024-03-12

## 2024-03-12 RX ORDER — VALACYCLOVIR HYDROCHLORIDE 1 G/1
1000 TABLET, FILM COATED ORAL 2 TIMES DAILY
Qty: 30 TABLET | Refills: 1 | Status: SHIPPED | OUTPATIENT
Start: 2024-03-12

## 2024-03-12 NOTE — PROGRESS NOTES
Patient ID: Jacob Gee 33 y.o.     Chief Complaint:   Chief Complaint   Patient presents with    Virtual B20 3 mo F/U      Patient and provider are located in the state of [place].    Face to Face time with patient:  30 minutes of total time spent on the encounter, which includes face to face time and non-face to face time preparing to see the patient (eg, review of tests), Obtaining and/or reviewing separately obtained history, Documenting clinical information in the electronic or other health record, Independently interpreting results (not separately reported) and communicating results to the patient/family/caregiver, or Care coordination (not separately reported).     Each patient to whom he or she provides medical services by telemedicine is:  (1) informed of the relationship between the physician and patient and the respective role of any other health care provider with respect to management of the patient; and (2) notified that he or she may decline to receive medical services by telemedicine and may withdraw from such care at any time.  HPI:    3/12/24  Jacob is a 32 yo WM evaluated today via audio-video virtual visit for HIV f/u.  He has been taking Genvoya daily with viral suppression, tolerates well. Labs 11/23 VL UD, 7/23 Cd4 674.  Discussed opportunity for enhancer free STR with excellent viral control and improved safety profile. He is amenable to ART switch to Biktarvy, tells me that he does have several months of surplus of Genvoya at home. Will complete medication stock, then start Biktarvy. He also states that he did have a couple of HSV outbreaks since last visit s/t increased stressors and had to use the Valtrex. Appreciates refill of same. Taking vitamin D3 daily, purchases OTC. Immunizations are UTD. He and Hakeem are doing well, no outside sexual partners. Will come in this week for routine labs. All questions answered & concerns addressed.    11/14/23  Jacob is a 32 yo WM evaluated today  via audio-video virtual visit for HIV f/u.  He is doing well on Genvoya daily, virally suppressed & tolerates it well. Labs 7/23 VL UD, CD4 674.  He has a history of syphilis, titer decreased to 4 dils.  3 point ct/gc screenings 7/23 negative.  He remains monogamous with Hakeem & they were  this summer. He appreciates flu vaccine, will come in today for vaccination & labs in clinic.  He denies any recent outbreaks, has valacyclovir on hand.  Vitamin D level stable, on OTC supplement. All questions answered & concerns addressed.     7/18/23  Jacob is a 32 yo WM here today for HIV f/u visit.  He is taking Genvoya daily, tolerates well with viral suppression.  Labs 4/18/23 VL UD, renal function & liver enzymes stable, 12/22 CD4 609.  RPR titer decreased to 8 dils 4/23, will repeat today. Did have to use Valtrex, appreciates refill today. Rarely uses hydroxyzine, no refill needed at this time. Remains monogamous with Hakeem.  Last routine STI screenings 1 year ago, amenable to routine screenings today. Has resumed vaping, tells me that his grandmother is ill & it offers him some respite.  Will revisit cessation in the future.  All questions answered & concerns addressed.              Past Medical History:   Diagnosis Date    Allergy     HIV infection         History reviewed. No pertinent surgical history.     Social History     Socioeconomic History    Marital status: Single   Tobacco Use    Smoking status: Every Day     Types: Vaping with nicotine    Smokeless tobacco: Never    Tobacco comments:     5% nicotine daily   Substance and Sexual Activity    Alcohol use: Not Currently    Drug use: Not Currently    Sexual activity: Yes     Partners: Male     Birth control/protection: None     Comment: /Monogomous   Social History Narrative    ** Merged History Encounter **             Family History   Problem Relation Age of Onset    No Known Problems Mother     No Known Problems Father     No Known Problems Sister      No Known Problems Brother         Review of patient's allergies indicates:  No Known Allergies     Immunization History   Administered Date(s) Administered    COVID-19, MRNA, LN-S, PF (Pfizer) (Purple Cap) 03/20/2021, 04/10/2021, 11/29/2021    COVID-19, mRNA, LNP-S, bivalent booster, PF (PFIZER OMICRON) 03/22/2023    DTP 1990, 1990, 07/25/1991, 04/02/1992, 05/04/1995    HIB 1990, 07/25/1991, 04/02/1992    HPV 9-Valent 03/01/2017, 06/06/2017, 06/06/2017, 02/14/2018    Hepatitis A, Adult 07/17/2015    Hepatitis B, Pediatric/Adolescent 02/08/2008, 03/07/2008, 06/27/2008    Influenza 02/14/2018    Influenza (FLUBLOK) - Quadrivalent - Recombinant - PF *Preferred* (egg allergy) 02/14/2018    Influenza - Intranasal - Trivalent 10/22/2008    Influenza - Quadrivalent 12/28/2016, 10/07/2020    Influenza - Quadrivalent - MDCK - PF 11/29/2021    Influenza - Quadrivalent - PF (6-35 months) 02/14/2019    Influenza - Quadrivalent - PF *Preferred* (6 months and older) 10/07/2020, 12/05/2022, 11/14/2023    Influenza - Trivalent - PF (ADULT) 10/10/2019, 11/29/2021    MMR 04/02/1992, 05/04/1995    Meningococcal Conjugate (MCV4P) 02/08/2008, 08/29/2018, 02/14/2019    OPV 1990, 1990, 04/02/1992, 05/04/1995    Pneumococcal Conjugate - 13 Valent 07/17/2015    Pneumococcal Polysaccharide - 23 Valent 09/28/2015, 02/01/2021    Tdap 02/08/2008, 09/28/2015        Review of Systems   Constitutional: Negative.    HENT: Negative.     Eyes: Negative.    Respiratory: Negative.     Cardiovascular: Negative.    Gastrointestinal: Negative.    Genitourinary: Negative.    Musculoskeletal: Negative.    Skin: Negative.    Neurological: Negative.    Endo/Heme/Allergies: Negative.    Psychiatric/Behavioral: Negative.     All other systems reviewed and are negative.         Objective:      There were no vitals taken for this visit.     Physical Exam  Constitutional:       General: He is not in acute distress.     Appearance:  Normal appearance.   HENT:      Head: Normocephalic.   Eyes:      Conjunctiva/sclera: Conjunctivae normal.   Pulmonary:      Effort: Pulmonary effort is normal.   Musculoskeletal:         General: Normal range of motion.      Cervical back: Normal range of motion.   Neurological:      General: No focal deficit present.      Mental Status: He is alert and oriented to person, place, and time. Mental status is at baseline.   Psychiatric:         Mood and Affect: Mood normal.         Behavior: Behavior normal.         Thought Content: Thought content normal.         Judgment: Judgment normal.          Labs:   Lab Results   Component Value Date    WBC 5.43 07/18/2023    HGB 14.0 07/18/2023    HCT 42.3 07/18/2023    .0 (H) 07/18/2023     07/18/2023       CMP  Sodium Level   Date Value Ref Range Status   07/18/2023 139 136 - 145 mmol/L Final     Potassium Level   Date Value Ref Range Status   07/18/2023 4.5 3.5 - 5.1 mmol/L Final     Carbon Dioxide   Date Value Ref Range Status   07/18/2023 28 22 - 29 mmol/L Final     Blood Urea Nitrogen   Date Value Ref Range Status   07/18/2023 20.8 (H) 8.9 - 20.6 mg/dL Final     Creatinine   Date Value Ref Range Status   07/18/2023 0.96 0.73 - 1.18 mg/dL Final     Calcium Level Total   Date Value Ref Range Status   07/18/2023 9.8 8.4 - 10.2 mg/dL Final     Albumin Level   Date Value Ref Range Status   07/18/2023 4.2 3.5 - 5.0 g/dL Final     Bilirubin Total   Date Value Ref Range Status   07/18/2023 0.2 <=1.5 mg/dL Final     Alkaline Phosphatase   Date Value Ref Range Status   07/18/2023 35 (L) 40 - 150 unit/L Final     Aspartate Aminotransferase   Date Value Ref Range Status   07/18/2023 32 5 - 34 unit/L Final     Alanine Aminotransferase   Date Value Ref Range Status   07/18/2023 27 0 - 55 unit/L Final     eGFR   Date Value Ref Range Status   07/18/2023 >60 mls/min/1.73/m2 Final     Lab Results   Component Value Date    TSH 1.563 07/18/2023     Hep C Ab Interp   Date  Value Ref Range Status   07/18/2023 Nonreactive Nonreactive Final     Syphilis Antibody   Date Value Ref Range Status   07/18/2023 Reactive (A) Nonreactive, Equivocal Final     RPR   Date Value Ref Range Status   07/18/2023 Reactive (A) Non-Reactive Final     RPR Titer   Date Value Ref Range Status   07/18/2023 4 dils (A) (none) Final     Cholesterol Total   Date Value Ref Range Status   07/18/2023 175 <=200 mg/dL Final     HDL Cholesterol   Date Value Ref Range Status   07/18/2023 53 35 - 60 mg/dL Final     Triglyceride   Date Value Ref Range Status   07/18/2023 60 34 - 140 mg/dL Final     Cholesterol/HDL Ratio   Date Value Ref Range Status   07/18/2023 3 0 - 5 Final     Very Low Density Lipoprotein   Date Value Ref Range Status   07/18/2023 12  Final     LDL Cholesterol   Date Value Ref Range Status   07/18/2023 110.00 50.00 - 140.00 mg/dL Final     Vit D 25 OH   Date Value Ref Range Status   07/18/2023 49.1 30.0 - 80.0 ng/mL Final     Results for orders placed or performed in visit on 12/05/22   CD4 Lymphocytes   Result Value Ref Range    Patient Age 32     WBC Absolute 6,400 4,500 - 11,500 /mm3    Lymph Percent 23 (L) 28 - 48 %    Lymph Absolute 1,472 1,260 - 5,520 x10(3)/mcL    CD4 % 41.4 %    CD4 Absolute 609.408 589 - 1,505 unit/L    T Cell Interp       The absolute count and percentage of T-helper/inducer cells are both normal.    Dr. Cresencio Fowler     Results for orders placed or performed in visit on 11/14/23   HIV-1 RNA, Quantitative, PCR with Reflex to Genotype   Result Value Ref Range    HIV-1 RNA Detect/Quant, P Undetected Undetected copies/mL     Results for orders placed or performed in visit on 07/18/23   Quantiferon Gold TB   Result Value Ref Range    QuantiFERON-Tb Gold Plus Result Negative Negative    TB1 Ag minus Nil Result -0.01 IU/mL    TB2 Ag minus Nil Result -0.02 IU/mL    Mitogen minus Nil Result 9.54 IU/mL    Nil Result 0.05 IU/mL     Results for orders placed or performed in visit on  07/18/23   C.trach/N.gonor AMP RNA   Result Value Ref Range    Chlamydia trachomatis amplified RNA Negative Negative    Neisseria gonorrhoeae amplified RNA Negative Negative    Source oral/throat     SOURCE: oral/throat      Results for orders placed or performed in visit on 07/18/23   Urinalysis   Result Value Ref Range    Color, UA Light-Yellow Yellow, Light-Yellow, Dark Yellow, Oz, Straw    Appearance, UA Clear Clear    Specific Gravity, UA 1.016     pH, UA 6.0 5.0 - 8.5    Protein, UA Negative Negative    Glucose, UA Normal Negative, Normal    Ketones, UA Negative Negative    Blood, UA Negative Negative    Bilirubin, UA Negative Negative    Urobilinogen, UA Normal 0.2, 1.0, Normal    Nitrites, UA Negative Negative    Leukocyte Esterase, UA Negative Negative    WBC, UA None Seen None Seen, 0-2, 3-5, 0-5 /HPF    Bacteria, UA None Seen None Seen /HPF    Squamous Epithelial Cells, UA Trace (A) None Seen /HPF    Mucous, UA Trace (A) None Seen /LPF    Hyaline Casts, UA None Seen None Seen /lpf    RBC, UA 0-5 None Seen, 0-2, 3-5, 0-5 /HPF       Imaging: Reviewed most recent relevant imaging studies available, notable results highlighted in this note    Medications:     Current Outpatient Medications   Medication Instructions    gkdhlqgxo-hmcudmfy-kgqfjek ala (BIKTARVY) -25 mg (25 kg or greater) 1 tablet, Oral, Daily    cholecalciferol, vitamin D3, (VITAMIN D3) 50 mcg (2,000 unit) Cap capsule Oral, Daily    hydrOXYzine pamoate (VISTARIL) 25 mg, Oral, Nightly    loratadine (CLARITIN) 10 mg, Oral, Daily    multivitamin (THERAGRAN) per tablet 1 tablet, Oral, Daily    valACYclovir (VALTREX) 1,000 mg, Oral, 2 times daily       Assessment:       Problem List Items Addressed This Visit          ID    HIV disease - Primary    Relevant Medications    oojjgebbz-fdihczxo-nvsyomr ala (BIKTARVY) -25 mg (25 kg or greater)    Other Relevant Orders    CD4 Lymphocytes    Comprehensive Metabolic Panel    HIV-1 RNA,  Quantitative, PCR with Reflex to Genotype     Other Visit Diagnoses       HSV (herpes simplex virus) anogenital infection        Relevant Medications    valACYclovir (VALTREX) 1000 MG tablet               Plan:      HIV disease  -     mjhyiyirc-juuftdyl-etkjgbu ala (BIKTARVY) -25 mg (25 kg or greater); Take 1 tablet by mouth once daily.  Dispense: 30 tablet; Refill: 3  -     CD4 Lymphocytes; Future; Expected date: 03/12/2024  -     Comprehensive Metabolic Panel; Future; Expected date: 03/12/2024  -     HIV-1 RNA, Quantitative, PCR with Reflex to Genotype; Future; Expected date: 03/12/2024  Adherence and sexual health counseling done.  Use condoms for all sexual encounters.  Blood precautions.   Cpmplete current supply of Genvoya 1 tab daily.  Then switch to Biktarvy 1 po daily.  Labs this week.  RTC 4 months with debbie Damico.     HIV Wellness:  Anal pap: 8/22 NIL  Oral CT/GC: 8/22 Neg, 7/23 Neg  Anal CT/GC: 8/22 Neg, 7/23 Neg  Urine CT/GC: 8/22 Neg, 7/23 Neg  RPR: 8/22 1:8, 12/22 16 dils (treated), 4/23 8 dils, 7/23 4 dils  Ophth: 6/21     HSV (herpes simplex virus) anogenital infection  -     valACYclovir (VALTREX) 1000 MG tablet; Take 1 tablet (1,000 mg total) by mouth 2 (two) times daily.  Dispense: 30 tablet; Refill: 1  Valtrex PRN outbreak.     History of syphilis  Serofast at 4-8 dils.   Increased to 16 dils 12/22, retreated with Bicillin 2.4 mil units IM.   4/23 8 dils, 7/23 4 dils.     Vitamin D deficiency  Controlled.   Continue OTC vitamin d3 daily.

## 2024-04-03 ENCOUNTER — LAB VISIT (OUTPATIENT)
Dept: LAB | Facility: HOSPITAL | Age: 34
End: 2024-04-03
Attending: NURSE PRACTITIONER
Payer: COMMERCIAL

## 2024-04-03 DIAGNOSIS — B20 HIV DISEASE: ICD-10-CM

## 2024-04-03 DIAGNOSIS — Z86.19 HISTORY OF SYPHILIS: ICD-10-CM

## 2024-04-03 LAB
ALBUMIN SERPL-MCNC: 4.4 G/DL (ref 3.5–5)
ALBUMIN/GLOB SERPL: 1.5 RATIO (ref 1.1–2)
ALP SERPL-CCNC: 41 UNIT/L (ref 40–150)
ALT SERPL-CCNC: 32 UNIT/L (ref 0–55)
AST SERPL-CCNC: 41 UNIT/L (ref 5–34)
BILIRUB SERPL-MCNC: 0.4 MG/DL
BUN SERPL-MCNC: 25.9 MG/DL (ref 8.9–20.6)
CALCIUM SERPL-MCNC: 9.7 MG/DL (ref 8.4–10.2)
CHLORIDE SERPL-SCNC: 107 MMOL/L (ref 98–107)
CO2 SERPL-SCNC: 26 MMOL/L (ref 22–29)
CREAT SERPL-MCNC: 1.03 MG/DL (ref 0.73–1.18)
GFR SERPLBLD CREATININE-BSD FMLA CKD-EPI: >60 MLS/MIN/1.73/M2
GLOBULIN SER-MCNC: 2.9 GM/DL (ref 2.4–3.5)
GLUCOSE SERPL-MCNC: 100 MG/DL (ref 74–100)
POTASSIUM SERPL-SCNC: 4.5 MMOL/L (ref 3.5–5.1)
PROT SERPL-MCNC: 7.3 GM/DL (ref 6.4–8.3)
RPR SER QL: REACTIVE
RPR SER-TITR: ABNORMAL {TITER}
SODIUM SERPL-SCNC: 139 MMOL/L (ref 136–145)
T PALLIDUM AB SER QL: REACTIVE

## 2024-04-03 PROCEDURE — 36415 COLL VENOUS BLD VENIPUNCTURE: CPT

## 2024-04-03 PROCEDURE — 87536 HIV-1 QUANT&REVRSE TRNSCRPJ: CPT

## 2024-04-03 PROCEDURE — 86780 TREPONEMA PALLIDUM: CPT

## 2024-04-03 PROCEDURE — 86592 SYPHILIS TEST NON-TREP QUAL: CPT

## 2024-04-03 PROCEDURE — 80053 COMPREHEN METABOLIC PANEL: CPT

## 2024-04-03 PROCEDURE — 86360 T CELL ABSOLUTE COUNT/RATIO: CPT

## 2024-04-04 LAB
CD3 CELLS # BLD: 703 CELLS/MCL (ref 550–2202)
CD3 CELLS NFR BLD: 81 % (ref 58–86)
CD3+CD4+ CELLS # BLD: 422 CELLS/MCL (ref 365–1437)
CD3+CD4+ CELLS NFR BLD: 49 % (ref 32–64)
CD3+CD4+ CELLS/CD3+CD8+ CLL BLD: 1.5 %
CD3+CD8+ CELLS # BLD: 277 CELLS/MCL (ref 171–846)
CD3+CD8+ CELLS NFR BLD: 32 % (ref 15–40)
CD45 CELLS # BLD: 0.87 THOU/MCL (ref 0.82–2.84)

## 2024-04-05 LAB — HIV1 RNA # PLAS NAA DL=20: NORMAL COPIES/ML

## 2024-06-09 DIAGNOSIS — T78.40XS ALLERGY, SEQUELA: ICD-10-CM

## 2024-06-10 RX ORDER — LORATADINE 10 MG/1
10 TABLET ORAL DAILY
Qty: 90 TABLET | Refills: 3 | Status: SHIPPED | OUTPATIENT
Start: 2024-06-10 | End: 2025-06-10

## 2024-06-10 NOTE — TELEPHONE ENCOUNTER
Last visit with Margaux Hardy, APRN: 3/12/2024  Last visit in [facility] INFECTIOUS DISEASE: 3/12/2024    Patient's next visit in [facility] INFECTIOUS DISEASE: 7/23/2024     LL 04/03/2024    Please advise on medication refill

## 2024-06-25 DIAGNOSIS — B20 HIV DISEASE: ICD-10-CM

## 2024-06-25 RX ORDER — BICTEGRAVIR SODIUM, EMTRICITABINE, AND TENOFOVIR ALAFENAMIDE FUMARATE 50; 200; 25 MG/1; MG/1; MG/1
1 TABLET ORAL
Qty: 30 TABLET | Refills: 1 | Status: SHIPPED | OUTPATIENT
Start: 2024-06-25

## 2024-07-23 ENCOUNTER — OFFICE VISIT (OUTPATIENT)
Dept: INFECTIOUS DISEASES | Facility: CLINIC | Age: 34
End: 2024-07-23
Payer: COMMERCIAL

## 2024-07-23 ENCOUNTER — PATIENT MESSAGE (OUTPATIENT)
Dept: INFECTIOUS DISEASES | Facility: CLINIC | Age: 34
End: 2024-07-23

## 2024-07-23 ENCOUNTER — LAB VISIT (OUTPATIENT)
Dept: LAB | Facility: HOSPITAL | Age: 34
End: 2024-07-23
Attending: NURSE PRACTITIONER
Payer: COMMERCIAL

## 2024-07-23 DIAGNOSIS — G47.00 INSOMNIA, UNSPECIFIED TYPE: ICD-10-CM

## 2024-07-23 DIAGNOSIS — B20 HIV DISEASE: Primary | ICD-10-CM

## 2024-07-23 DIAGNOSIS — B20 HIV DISEASE: ICD-10-CM

## 2024-07-23 LAB
25(OH)D3+25(OH)D2 SERPL-MCNC: 98 NG/ML (ref 30–80)
ALBUMIN SERPL-MCNC: 4.4 G/DL (ref 3.5–5)
ALBUMIN/GLOB SERPL: 1.5 RATIO (ref 1.1–2)
ALP SERPL-CCNC: 42 UNIT/L (ref 40–150)
ALT SERPL-CCNC: 31 UNIT/L (ref 0–55)
ANION GAP SERPL CALC-SCNC: 5 MEQ/L
AST SERPL-CCNC: 33 UNIT/L (ref 5–34)
BACTERIA #/AREA URNS AUTO: ABNORMAL /HPF
BASOPHILS # BLD AUTO: 0.03 X10(3)/MCL
BASOPHILS NFR BLD AUTO: 0.3 %
BILIRUB SERPL-MCNC: 0.3 MG/DL
BILIRUB UR QL STRIP.AUTO: NEGATIVE
BUN SERPL-MCNC: 19.5 MG/DL (ref 8.9–20.6)
C TRACH DNA SPEC QL NAA+PROBE: NOT DETECTED
CALCIUM SERPL-MCNC: 9.5 MG/DL (ref 8.4–10.2)
CHLORIDE SERPL-SCNC: 105 MMOL/L (ref 98–107)
CHOLEST SERPL-MCNC: 151 MG/DL
CHOLEST/HDLC SERPL: 3 {RATIO} (ref 0–5)
CLARITY UR: CLEAR
CO2 SERPL-SCNC: 26 MMOL/L (ref 22–29)
COLOR UR AUTO: ABNORMAL
CREAT SERPL-MCNC: 1.1 MG/DL (ref 0.73–1.18)
CREAT/UREA NIT SERPL: 18
EOSINOPHIL # BLD AUTO: 0 X10(3)/MCL (ref 0–0.9)
EOSINOPHIL NFR BLD AUTO: 0 %
ERYTHROCYTE [DISTWIDTH] IN BLOOD BY AUTOMATED COUNT: 13.2 % (ref 11.5–17)
EST. AVERAGE GLUCOSE BLD GHB EST-MCNC: 105.4 MG/DL
GFR SERPLBLD CREATININE-BSD FMLA CKD-EPI: >60 ML/MIN/1.73/M2
GLOBULIN SER-MCNC: 2.9 GM/DL (ref 2.4–3.5)
GLUCOSE SERPL-MCNC: 99 MG/DL (ref 74–100)
GLUCOSE UR QL STRIP: NORMAL
HAV AB SER QL IA: NONREACTIVE
HBA1C MFR BLD: 5.3 %
HBV SURFACE AB SER-ACNC: 3206.44 MIU/ML
HBV SURFACE AB SERPL IA-ACNC: REACTIVE M[IU]/ML
HCT VFR BLD AUTO: 40.3 % (ref 42–52)
HCV AB SERPL QL IA: NONREACTIVE
HDLC SERPL-MCNC: 54 MG/DL (ref 35–60)
HGB BLD-MCNC: 13.9 G/DL (ref 14–18)
HGB UR QL STRIP: NEGATIVE
HYALINE CASTS #/AREA URNS LPF: ABNORMAL /LPF
IMM GRANULOCYTES # BLD AUTO: 0.04 X10(3)/MCL (ref 0–0.04)
IMM GRANULOCYTES NFR BLD AUTO: 0.3 %
KETONES UR QL STRIP: NEGATIVE
LDLC SERPL CALC-MCNC: 89 MG/DL (ref 50–140)
LEUKOCYTE ESTERASE UR QL STRIP: NEGATIVE
LYMPHOCYTES # BLD AUTO: 1.3 X10(3)/MCL (ref 0.6–4.6)
LYMPHOCYTES NFR BLD AUTO: 11.1 %
MCH RBC QN AUTO: 33.6 PG (ref 27–31)
MCHC RBC AUTO-ENTMCNC: 34.5 G/DL (ref 33–36)
MCV RBC AUTO: 97.3 FL (ref 80–94)
MONOCYTES # BLD AUTO: 1.13 X10(3)/MCL (ref 0.1–1.3)
MONOCYTES NFR BLD AUTO: 9.7 %
MUCOUS THREADS URNS QL MICRO: ABNORMAL /LPF
N GONORRHOEA DNA SPEC QL NAA+PROBE: NOT DETECTED
NEUTROPHILS # BLD AUTO: 9.17 X10(3)/MCL (ref 2.1–9.2)
NEUTROPHILS NFR BLD AUTO: 78.6 %
NITRITE UR QL STRIP: NEGATIVE
NRBC BLD AUTO-RTO: 0 %
PH UR STRIP: 6 [PH]
PLATELET # BLD AUTO: 205 X10(3)/MCL (ref 130–400)
PMV BLD AUTO: 10.1 FL (ref 7.4–10.4)
POTASSIUM SERPL-SCNC: 4.2 MMOL/L (ref 3.5–5.1)
PROT SERPL-MCNC: 7.3 GM/DL (ref 6.4–8.3)
PROT UR QL STRIP: NEGATIVE
RBC # BLD AUTO: 4.14 X10(6)/MCL (ref 4.7–6.1)
RBC #/AREA URNS AUTO: ABNORMAL /HPF
SODIUM SERPL-SCNC: 136 MMOL/L (ref 136–145)
SOURCE (OHS): NORMAL
SP GR UR STRIP.AUTO: 1.02 (ref 1–1.03)
SQUAMOUS #/AREA URNS LPF: ABNORMAL /HPF
T PALLIDUM AB SER QL: REACTIVE
TRIGL SERPL-MCNC: 39 MG/DL (ref 34–140)
TSH SERPL-ACNC: 1.76 UIU/ML (ref 0.35–4.94)
UROBILINOGEN UR STRIP-ACNC: NORMAL
VLDLC SERPL CALC-MCNC: 8 MG/DL
WBC # BLD AUTO: 11.67 X10(3)/MCL (ref 4.5–11.5)
WBC #/AREA URNS AUTO: ABNORMAL /HPF

## 2024-07-23 PROCEDURE — 87536 HIV-1 QUANT&REVRSE TRNSCRPJ: CPT

## 2024-07-23 PROCEDURE — 86480 TB TEST CELL IMMUN MEASURE: CPT

## 2024-07-23 PROCEDURE — 81015 MICROSCOPIC EXAM OF URINE: CPT

## 2024-07-23 PROCEDURE — 86780 TREPONEMA PALLIDUM: CPT

## 2024-07-23 PROCEDURE — 99214 OFFICE O/P EST MOD 30 MIN: CPT | Mod: 95,,, | Performed by: NURSE PRACTITIONER

## 2024-07-23 PROCEDURE — 87591 N.GONORRHOEAE DNA AMP PROB: CPT

## 2024-07-23 PROCEDURE — 80061 LIPID PANEL: CPT

## 2024-07-23 PROCEDURE — 86708 HEPATITIS A ANTIBODY: CPT

## 2024-07-23 PROCEDURE — 36415 COLL VENOUS BLD VENIPUNCTURE: CPT

## 2024-07-23 PROCEDURE — 87491 CHLMYD TRACH DNA AMP PROBE: CPT

## 2024-07-23 PROCEDURE — 85025 COMPLETE CBC W/AUTO DIFF WBC: CPT

## 2024-07-23 PROCEDURE — 86706 HEP B SURFACE ANTIBODY: CPT

## 2024-07-23 PROCEDURE — 1160F RVW MEDS BY RX/DR IN RCRD: CPT | Mod: CPTII,95,, | Performed by: NURSE PRACTITIONER

## 2024-07-23 PROCEDURE — 84443 ASSAY THYROID STIM HORMONE: CPT

## 2024-07-23 PROCEDURE — 1159F MED LIST DOCD IN RCRD: CPT | Mod: CPTII,95,, | Performed by: NURSE PRACTITIONER

## 2024-07-23 PROCEDURE — 82306 VITAMIN D 25 HYDROXY: CPT

## 2024-07-23 PROCEDURE — 80053 COMPREHEN METABOLIC PANEL: CPT

## 2024-07-23 PROCEDURE — 86803 HEPATITIS C AB TEST: CPT

## 2024-07-23 PROCEDURE — 83036 HEMOGLOBIN GLYCOSYLATED A1C: CPT

## 2024-07-23 PROCEDURE — 86361 T CELL ABSOLUTE COUNT: CPT

## 2024-07-23 PROCEDURE — 86592 SYPHILIS TEST NON-TREP QUAL: CPT

## 2024-07-23 RX ORDER — BICTEGRAVIR SODIUM, EMTRICITABINE, AND TENOFOVIR ALAFENAMIDE FUMARATE 50; 200; 25 MG/1; MG/1; MG/1
1 TABLET ORAL DAILY
Qty: 90 TABLET | Refills: 1 | Status: SHIPPED | OUTPATIENT
Start: 2024-07-23

## 2024-07-23 RX ORDER — TRAZODONE HYDROCHLORIDE 50 MG/1
50 TABLET ORAL NIGHTLY
Qty: 30 TABLET | Refills: 3 | Status: SHIPPED | OUTPATIENT
Start: 2024-07-23 | End: 2025-07-23

## 2024-07-23 NOTE — PROGRESS NOTES
Patient ID: Jacob Gee 34 y.o.     Chief Complaint:   Chief Complaint   Patient presents with    Followup HIV     Denies problems   Patient and provider are located in the state [facility].    Face to Face time with patient:  30 minutes of total time spent on the encounter, which includes face to face time and non-face to face time preparing to see the patient (eg, review of tests), Obtaining and/or reviewing separately obtained history, Documenting clinical information in the electronic or other health record, Independently interpreting results (not separately reported) and communicating results to the patient/family/caregiver, or Care coordination (not separately reported).     Each patient to whom he or she provides medical services by telemedicine is:  (1) informed of the relationship between the physician and patient and the respective role of any other health care provider with respect to management of the patient; and (2) notified that he or she may decline to receive medical services by telemedicine and may withdraw from such care at any time.     HPI:    7/23/24  Jacbo is a 33 yo WM evaluated today via audio-video virtual visit for HIV f/u visit.  He switched to Biktarvy as discussed last visit and is tolerating it fine. He has noted that he needs to eat a small snack with it, otherwise will upset his stomach. Last labs 4/24 VL UD, CD4 422, RPR 4 dils.  Denies any new sexual partners, still in relationship with Hakeem. Valtrex effective for outbreaks, does not need refills at this  time.  He does still have trouble with staying asleep but does not like using the hydroxyzine as he feels like it causes a hangover effect in the morning. Has uses trazodone in rehab in the past & tolerated the 50 mg dose very well. Will send prescription for same as requested. He will come to [facility] today for routine labs as well. All questions answered & concerns addressed.    3/12/24  Jacob is a 34 yo WM evaluated today via  audio-video virtual visit for HIV f/u.  He has been taking Genvoya daily with viral suppression, tolerates well. Labs 11/23 VL UD, 7/23 Cd4 674.  Discussed opportunity for enhancer free STR with excellent viral control and improved safety profile. He is amenable to ART switch to Biktarvy, tells me that he does have several months of surplus of Genvoya at home. Will complete medication stock, then start Biktarvy. He also states that he did have a couple of HSV outbreaks since last visit s/t increased stressors and had to use the Valtrex. Appreciates refill of same. Taking vitamin D3 daily, purchases OTC. Immunizations are UTD. He and Hakeem are doing well, no outside sexual partners. Will come in this week for routine labs. All questions answered & concerns addressed.     11/14/23  Jacob is a 34 yo WM evaluated today via audio-video virtual visit for HIV f/u.  He is doing well on Genvoya daily, virally suppressed & tolerates it well. Labs 7/23 VL UD, CD4 674.  He has a history of syphilis, titer decreased to 4 dils.  3 point ct/gc screenings 7/23 negative.  He remains monogamous with Hakeem & they were  this summer. He appreciates flu vaccine, will come in today for vaccination & labs in clinic.  He denies any recent outbreaks, has valacyclovir on hand.  Vitamin D level stable, on OTC supplement. All questions answered & concerns addressed.           Past Medical History:   Diagnosis Date    Allergy     HIV infection         History reviewed. No pertinent surgical history.     Social History     Socioeconomic History    Marital status: Single   Tobacco Use    Smoking status: Every Day     Types: Vaping with nicotine    Smokeless tobacco: Never    Tobacco comments:     5% nicotine daily   Substance and Sexual Activity    Alcohol use: Not Currently     Comment: denies    Drug use: Not Currently    Sexual activity: Yes     Partners: Male     Birth control/protection: None     Comment: /Monogomous   Social  History Narrative    ** Merged History Encounter **             Family History   Problem Relation Name Age of Onset    No Known Problems Mother      No Known Problems Father      No Known Problems Sister      No Known Problems Brother          Review of patient's allergies indicates:  No Known Allergies     Immunization History   Administered Date(s) Administered    COVID-19, MRNA, LN-S, PF (Pfizer) (Purple Cap) 03/20/2021, 04/10/2021, 11/29/2021    COVID-19, mRNA, LNP-S, bivalent booster, PF (PFIZER OMICRON) 03/22/2023    DTP 1990, 1990, 07/25/1991, 04/02/1992, 05/04/1995    HIB 1990, 07/25/1991, 04/02/1992    HPV 9-Valent 03/01/2017, 06/06/2017, 06/06/2017, 02/14/2018    Hepatitis A, Adult 07/17/2015    Hepatitis B, Pediatric/Adolescent 02/08/2008, 03/07/2008, 06/27/2008    Influenza 02/14/2018    Influenza (FLUBLOK) - Quadrivalent - Recombinant - PF *Preferred* (egg allergy) 02/14/2018    Influenza - Intranasal - Trivalent 10/22/2008    Influenza - Quadrivalent 12/28/2016, 10/07/2020    Influenza - Quadrivalent - MDCK - PF 11/29/2021    Influenza - Quadrivalent - PF (6-35 months) 02/14/2019    Influenza - Quadrivalent - PF *Preferred* (6 months and older) 10/07/2020, 12/05/2022, 11/14/2023    Influenza - Trivalent - PF (ADULT) 10/10/2019, 11/29/2021    MMR 04/02/1992, 05/04/1995    Meningococcal Conjugate (MCV4P) 02/08/2008, 08/29/2018, 02/14/2019    OPV 1990, 1990, 04/02/1992, 05/04/1995    Pneumococcal Conjugate - 13 Valent 07/17/2015, 07/17/2015    Pneumococcal Polysaccharide - 23 Valent 09/28/2015, 02/01/2021    Tdap 02/08/2008, 09/28/2015        Review of Systems   Constitutional: Negative.    HENT: Negative.     Eyes: Negative.    Respiratory: Negative.     Cardiovascular: Negative.    Gastrointestinal: Negative.    Genitourinary: Negative.    Musculoskeletal: Negative.    Skin: Negative.    Neurological: Negative.    Endo/Heme/Allergies: Negative.    Psychiatric/Behavioral:  Negative.     All other systems reviewed and are negative.         Objective:      There were no vitals taken for this visit.     Physical Exam  Constitutional:       General: He is not in acute distress.     Appearance: Normal appearance.   HENT:      Head: Normocephalic.   Eyes:      Conjunctiva/sclera: Conjunctivae normal.   Pulmonary:      Effort: Pulmonary effort is normal.   Musculoskeletal:         General: Normal range of motion.      Cervical back: Normal range of motion.   Neurological:      General: No focal deficit present.      Mental Status: He is alert and oriented to person, place, and time. Mental status is at baseline.   Psychiatric:         Mood and Affect: Mood normal.         Behavior: Behavior normal.         Thought Content: Thought content normal.         Judgment: Judgment normal.          Labs:   Lab Results   Component Value Date    WBC 5.43 07/18/2023    HGB 14.0 07/18/2023    HCT 42.3 07/18/2023    .0 (H) 07/18/2023     07/18/2023       CMP  Sodium   Date Value Ref Range Status   04/03/2024 139 136 - 145 mmol/L Final     Potassium   Date Value Ref Range Status   04/03/2024 4.5 3.5 - 5.1 mmol/L Final     Chloride   Date Value Ref Range Status   04/03/2024 107 98 - 107 mmol/L Final     CO2   Date Value Ref Range Status   04/03/2024 26 22 - 29 mmol/L Final     Blood Urea Nitrogen   Date Value Ref Range Status   04/03/2024 25.9 (H) 8.9 - 20.6 mg/dL Final     Creatinine   Date Value Ref Range Status   04/03/2024 1.03 0.73 - 1.18 mg/dL Final     Calcium   Date Value Ref Range Status   04/03/2024 9.7 8.4 - 10.2 mg/dL Final     Albumin   Date Value Ref Range Status   04/03/2024 4.4 3.5 - 5.0 g/dL Final     Bilirubin Total   Date Value Ref Range Status   04/03/2024 0.4 <=1.5 mg/dL Final     ALP   Date Value Ref Range Status   04/03/2024 41 40 - 150 unit/L Final     AST   Date Value Ref Range Status   04/03/2024 41 (H) 5 - 34 unit/L Final     ALT   Date Value Ref Range Status    04/03/2024 32 0 - 55 unit/L Final     eGFR   Date Value Ref Range Status   04/03/2024 >60 mls/min/1.73/m2 Final     Lab Results   Component Value Date    TSH 1.563 07/18/2023     Hep C Ab Interp   Date Value Ref Range Status   07/18/2023 Nonreactive Nonreactive Final     Syphilis Antibody   Date Value Ref Range Status   04/03/2024 Reactive (A) Nonreactive, Equivocal Final     RPR   Date Value Ref Range Status   04/03/2024 Reactive (A) Non-Reactive Final     RPR Titer   Date Value Ref Range Status   04/03/2024 4 dils (A) (none) Final     Cholesterol Total   Date Value Ref Range Status   07/18/2023 175 <=200 mg/dL Final     HDL Cholesterol   Date Value Ref Range Status   07/18/2023 53 35 - 60 mg/dL Final     Triglyceride   Date Value Ref Range Status   07/18/2023 60 34 - 140 mg/dL Final     Cholesterol/HDL Ratio   Date Value Ref Range Status   07/18/2023 3 0 - 5 Final     Very Low Density Lipoprotein   Date Value Ref Range Status   07/18/2023 12  Final     LDL Cholesterol   Date Value Ref Range Status   07/18/2023 110.00 50.00 - 140.00 mg/dL Final     Vitamin D   Date Value Ref Range Status   07/18/2023 49.1 30.0 - 80.0 ng/mL Final     Results for orders placed or performed in visit on 12/05/22   CD4 Lymphocytes   Result Value Ref Range    Patient Age 32     WBC Absolute 6,400 4,500 - 11,500 /mm3    Lymph Percent 23 (L) 28 - 48 %    Lymph Absolute 1,472 1,260 - 5,520 x10(3)/mcL    CD4 % 41.4 %    CD4 Absolute 609.408 589 - 1,505 unit/L    T Cell Interp       The absolute count and percentage of T-helper/inducer cells are both normal.    Dr. Cresencio Fowler     Results for orders placed or performed in visit on 04/03/24   HIV-1 RNA, Quantitative, PCR with Reflex to Genotype   Result Value Ref Range    HIV-1 RNA Detect/Quant, P Undetected Undetected copies/mL     Results for orders placed or performed in visit on 07/18/23   Quantiferon Gold TB   Result Value Ref Range    QuantiFERON-Tb Gold Plus Result Negative Negative     TB1 Ag minus Nil Result -0.01 IU/mL    TB2 Ag minus Nil Result -0.02 IU/mL    Mitogen minus Nil Result 9.54 IU/mL    Nil Result 0.05 IU/mL     Results for orders placed or performed in visit on 07/18/23   C.trach/N.gonor AMP RNA    Specimen: Throat   Result Value Ref Range    Chlamydia trachomatis amplified RNA Negative Negative    Neisseria gonorrhoeae amplified RNA Negative Negative    Source oral/throat     SOURCE: oral/throat      Results for orders placed or performed in visit on 07/18/23   Urinalysis   Result Value Ref Range    Color, UA Light-Yellow Yellow, Light-Yellow, Dark Yellow, Zo, Straw    Appearance, UA Clear Clear    Specific Gravity, UA 1.016     pH, UA 6.0 5.0 - 8.5    Protein, UA Negative Negative    Glucose, UA Normal Negative, Normal    Ketones, UA Negative Negative    Blood, UA Negative Negative    Bilirubin, UA Negative Negative    Urobilinogen, UA Normal 0.2, 1.0, Normal    Nitrites, UA Negative Negative    Leukocyte Esterase, UA Negative Negative    WBC, UA None Seen None Seen, 0-2, 3-5, 0-5 /HPF    Bacteria, UA None Seen None Seen /HPF    Squamous Epithelial Cells, UA Trace (A) None Seen /HPF    Mucous, UA Trace (A) None Seen /LPF    Hyaline Casts, UA None Seen None Seen /lpf    RBC, UA 0-5 None Seen, 0-2, 3-5, 0-5 /HPF       Imaging: Reviewed most recent relevant imaging studies available, notable results highlighted in this note    Medications:     Current Outpatient Medications   Medication Instructions    uhqmatydy-cdizplmx-rokfimv ala (BIKTARVY) -25 mg (25 kg or greater) 1 tablet, Oral, Daily    cholecalciferol, vitamin D3, (VITAMIN D3) 50 mcg (2,000 unit) Cap capsule Oral, Daily    loratadine (CLARITIN) 10 mg, Oral, Daily    multivitamin (THERAGRAN) per tablet 1 tablet, Oral, Daily    traZODone (DESYREL) 50 mg, Oral, Nightly    valACYclovir (VALTREX) 1,000 mg, Oral, 2 times daily       Assessment:       Problem List Items Addressed This Visit          ID    HIV disease -  Primary    Relevant Medications    qidgzkhzl-exihbpbr-atkcyxy ala (BIKTARVY) -25 mg (25 kg or greater)    Other Relevant Orders    Quantiferon Gold TB    Hemoglobin A1C    Hepatitis C Antibody    Vitamin D    TSH    Lipid Panel    SYPHILIS ANTIBODY (WITH REFLEX RPR)    Chlamydia/GC, PCR    Urinalysis    Comprehensive Metabolic Panel    CBC Auto Differential    CD4 Lymphocytes    HIV-1 RNA, Quantitative, PCR with Reflex to Genotype    Hepatitis A antibody, IgG    Hepatitis B Surface Ab, Qualitative     Other Visit Diagnoses       Insomnia, unspecified type        Relevant Medications    traZODone (DESYREL) 50 MG tablet               Plan:      HIV disease  -     jnvemzkhe-fcrfmwvl-csriaha ala (BIKTARVY) -25 mg (25 kg or greater); Take 1 tablet by mouth once daily.  Dispense: 90 tablet; Refill: 1  -     Quantiferon Gold TB; Future; Expected date: 07/23/2024  -     Hemoglobin A1C; Future; Expected date: 07/23/2024  -     Hepatitis C Antibody; Future; Expected date: 07/23/2024  -     Vitamin D; Future; Expected date: 07/23/2024  -     TSH; Future; Expected date: 07/23/2024  -     Lipid Panel; Future; Expected date: 07/23/2024  -     SYPHILIS ANTIBODY (WITH REFLEX RPR); Future; Expected date: 07/23/2024  -     Chlamydia/GC, PCR; Future; Expected date: 07/23/2024  -     Urinalysis; Future; Expected date: 07/23/2024  -     Comprehensive Metabolic Panel; Future; Expected date: 07/23/2024  -     CBC Auto Differential; Future; Expected date: 07/23/2024  -     CD4 Lymphocytes; Future; Expected date: 07/23/2024  -     HIV-1 RNA, Quantitative, PCR with Reflex to Genotype; Future; Expected date: 07/23/2024  -     Hepatitis A antibody, IgG; Future; Expected date: 07/23/2024  -     Hepatitis B Surface Ab, Qualitative; Future; Expected date: 07/23/2024  Adherence and sexual health counseling done.  Use condoms for all sexual encounters.  Blood precautions.   Continue Biktarvy 1 po daily.  Labs this week.  RTC 4 months  with Margaux, in clinic.     HIV Wellness:  Anal pap: 8/22 NIL  Oral CT/GC: 8/22 Neg, 7/23 Neg  Anal CT/GC: 8/22 Neg, 7/23 Neg  Urine CT/GC: 8/22 Neg, 7/23 Neg, 7/24  RPR: 8/22 1:8, 12/22 16 dils (treated), 4/23 8 dils, 7/23 4 dils, 4/24 4 dils  Ophth: 6/21     Insomnia, unspecified type  -     traZODone (DESYREL) 50 MG tablet; Take 1 tablet (50 mg total) by mouth every evening.  Dispense: 30 tablet; Refill: 3  D/C hydroxyzine.   Replace with trazodone 50 mg PRN insomnia.    HSV (herpes simplex virus) anogenital infection  Valtrex PRN outbreak.      History of syphilis  Serofast at 4-8 dils.   Increased to 16 dils 12/22, retreated with Bicillin 2.4 mil units IM.   4/23 8 dils, 7/23 4 dils, 4/24 4 dils.     Vitamin D deficiency  Controlled.   Continue OTC vitamin d3 daily.  Check level with labs today.

## 2024-07-24 ENCOUNTER — TELEPHONE (OUTPATIENT)
Dept: INFECTIOUS DISEASES | Facility: CLINIC | Age: 34
End: 2024-07-24
Payer: COMMERCIAL

## 2024-07-24 LAB
RPR SER QL: REACTIVE
RPR SER-TITR: ABNORMAL {TITER}

## 2024-07-24 NOTE — TELEPHONE ENCOUNTER
Phoned patient. Discussed Vitamin D level is too high at 98. Notified pt of results. States he did see the lab results this morning on his patient portal and skipped it this morning. Instructed to hold until advised differently. Voiced understanding and appreciates call.

## 2024-07-24 NOTE — TELEPHONE ENCOUNTER
Lab results reviewed.  Vitamin D level is too high at 98.  Please notify pt of results and have him hold vitamin d supplement until advised otherwise.

## 2024-07-25 LAB
AGE: 34
CD3+CD4+ CELLS # SPEC: 591 UNIT/L (ref 589–1505)
CD3+CD4+ CELLS NFR BLD: 46 %
GAMMA INTERFERON BACKGROUND BLD IA-ACNC: 0.07 IU/ML
HIV1 RNA # PLAS NAA DL=20: NORMAL COPIES/ML
LYMPHOCYTES # BLD AUTO: 1283.7 X10(3)/MCL (ref 1260–5520)
LYMPHOCYTES NFR LN MANUAL: 11 % (ref 28–48)
LYMPHOMA - T-CELL MARKERS SPEC-IMP: ABNORMAL
M TB IFN-G BLD-IMP: ABNORMAL
M TB IFN-G CD4+ BCKGRND COR BLD-ACNC: 0 IU/ML
M TB IFN-G CD4+CD8+ BCKGRND COR BLD-ACNC: -0.02 IU/ML
MITOGEN IGNF BCKGRD COR BLD-ACNC: 0.42 IU/ML
WBC # BLD AUTO: ABNORMAL /MM3 (ref 4500–11500)

## 2024-07-26 DIAGNOSIS — B20 HIV DISEASE: Primary | ICD-10-CM

## 2024-07-26 NOTE — PROGRESS NOTES
With indeterminate quant gold.  Likely lab error.  Needs repeat.  Order has been placed.  Please let patient know to do at their convenience.

## 2024-07-29 ENCOUNTER — TELEPHONE (OUTPATIENT)
Dept: INFECTIOUS DISEASES | Facility: CLINIC | Age: 34
End: 2024-07-29
Payer: COMMERCIAL

## 2024-07-29 NOTE — TELEPHONE ENCOUNTER
Phoned patient. Discussed indeterminate quant gold.  Likely lab error.  Needs repeat.  Order has been placed and can be collected at the patient's convenience. Voiced understanding and appreciates call.

## 2024-07-29 NOTE — TELEPHONE ENCOUNTER
----- Message from KATIE Harris sent at 7/26/2024  1:28 PM CDT -----  With indeterminate quant gold.  Likely lab error.  Needs repeat.  Order has been placed.  Please let patient know to do at their convenience.

## 2024-08-14 ENCOUNTER — LAB VISIT (OUTPATIENT)
Dept: LAB | Facility: HOSPITAL | Age: 34
End: 2024-08-14
Attending: NURSE PRACTITIONER
Payer: COMMERCIAL

## 2024-08-14 DIAGNOSIS — B20 HIV DISEASE: ICD-10-CM

## 2024-08-14 PROCEDURE — 86480 TB TEST CELL IMMUN MEASURE: CPT

## 2024-08-14 PROCEDURE — 36415 COLL VENOUS BLD VENIPUNCTURE: CPT

## 2024-08-16 LAB
GAMMA INTERFERON BACKGROUND BLD IA-ACNC: 0.03 IU/ML
M TB IFN-G BLD-IMP: NEGATIVE
M TB IFN-G CD4+ BCKGRND COR BLD-ACNC: 0 IU/ML
M TB IFN-G CD4+CD8+ BCKGRND COR BLD-ACNC: 0 IU/ML
MITOGEN IGNF BCKGRD COR BLD-ACNC: 9.26 IU/ML

## 2024-09-18 DIAGNOSIS — T78.40XS ALLERGY, SEQUELA: ICD-10-CM

## 2024-09-18 RX ORDER — LORATADINE 10 MG/1
10 TABLET ORAL DAILY
Qty: 90 TABLET | Refills: 3 | Status: SHIPPED | OUTPATIENT
Start: 2024-09-18 | End: 2025-09-18

## 2024-09-18 NOTE — TELEPHONE ENCOUNTER
Last visit with Margaux Hardy, APRN: 7/23/2024  Last visit in [facility] INFECTIOUS DISEASE: 7/23/2024    Patient's next visit in [facility] INFECTIOUS DISEASE: 11/4/2024     LL 07/23/2024    Please advise on rx refill

## 2024-11-04 ENCOUNTER — OFFICE VISIT (OUTPATIENT)
Dept: INFECTIOUS DISEASES | Facility: CLINIC | Age: 34
End: 2024-11-04
Payer: COMMERCIAL

## 2024-11-04 ENCOUNTER — LAB VISIT (OUTPATIENT)
Dept: LAB | Facility: HOSPITAL | Age: 34
End: 2024-11-04
Attending: NURSE PRACTITIONER
Payer: COMMERCIAL

## 2024-11-04 VITALS
SYSTOLIC BLOOD PRESSURE: 97 MMHG | BODY MASS INDEX: 24.51 KG/M2 | TEMPERATURE: 98 F | WEIGHT: 175.06 LBS | HEIGHT: 71 IN | DIASTOLIC BLOOD PRESSURE: 58 MMHG | RESPIRATION RATE: 12 BRPM | HEART RATE: 62 BPM

## 2024-11-04 DIAGNOSIS — F41.8 DEPRESSION WITH ANXIETY: ICD-10-CM

## 2024-11-04 DIAGNOSIS — Z21 ASYMPTOMATIC HIV INFECTION, WITH NO HISTORY OF HIV-RELATED ILLNESS: Primary | ICD-10-CM

## 2024-11-04 DIAGNOSIS — B20 HIV DISEASE: ICD-10-CM

## 2024-11-04 DIAGNOSIS — Z23 NEED FOR VACCINATION: ICD-10-CM

## 2024-11-04 DIAGNOSIS — G47.00 INSOMNIA, UNSPECIFIED TYPE: ICD-10-CM

## 2024-11-04 PROCEDURE — 3078F DIAST BP <80 MM HG: CPT | Mod: CPTII,,, | Performed by: NURSE PRACTITIONER

## 2024-11-04 PROCEDURE — 36415 COLL VENOUS BLD VENIPUNCTURE: CPT | Performed by: NURSE PRACTITIONER

## 2024-11-04 PROCEDURE — 90471 IMMUNIZATION ADMIN: CPT | Mod: PBBFAC

## 2024-11-04 PROCEDURE — 87536 HIV-1 QUANT&REVRSE TRNSCRPJ: CPT

## 2024-11-04 PROCEDURE — 90472 IMMUNIZATION ADMIN EACH ADD: CPT | Mod: PBBFAC

## 2024-11-04 PROCEDURE — 99214 OFFICE O/P EST MOD 30 MIN: CPT | Mod: S$PBB,,, | Performed by: NURSE PRACTITIONER

## 2024-11-04 PROCEDURE — 90632 HEPA VACCINE ADULT IM: CPT | Mod: PBBFAC

## 2024-11-04 PROCEDURE — 1160F RVW MEDS BY RX/DR IN RCRD: CPT | Mod: CPTII,,, | Performed by: NURSE PRACTITIONER

## 2024-11-04 PROCEDURE — 1159F MED LIST DOCD IN RCRD: CPT | Mod: CPTII,,, | Performed by: NURSE PRACTITIONER

## 2024-11-04 PROCEDURE — 3074F SYST BP LT 130 MM HG: CPT | Mod: CPTII,,, | Performed by: NURSE PRACTITIONER

## 2024-11-04 PROCEDURE — 99214 OFFICE O/P EST MOD 30 MIN: CPT | Mod: PBBFAC | Performed by: NURSE PRACTITIONER

## 2024-11-04 PROCEDURE — 90656 IIV3 VACC NO PRSV 0.5 ML IM: CPT | Mod: PBBFAC

## 2024-11-04 PROCEDURE — 3044F HG A1C LEVEL LT 7.0%: CPT | Mod: CPTII,,, | Performed by: NURSE PRACTITIONER

## 2024-11-04 PROCEDURE — 3008F BODY MASS INDEX DOCD: CPT | Mod: CPTII,,, | Performed by: NURSE PRACTITIONER

## 2024-11-04 RX ORDER — FLUOXETINE 10 MG/1
10 CAPSULE ORAL DAILY
Qty: 90 CAPSULE | Refills: 3 | Status: SHIPPED | OUTPATIENT
Start: 2024-11-04 | End: 2025-11-04

## 2024-11-04 RX ORDER — BICTEGRAVIR SODIUM, EMTRICITABINE, AND TENOFOVIR ALAFENAMIDE FUMARATE 50; 200; 25 MG/1; MG/1; MG/1
1 TABLET ORAL DAILY
Qty: 90 TABLET | Refills: 1 | Status: SHIPPED | OUTPATIENT
Start: 2024-11-04 | End: 2024-11-04

## 2024-11-04 RX ORDER — BICTEGRAVIR SODIUM, EMTRICITABINE, AND TENOFOVIR ALAFENAMIDE FUMARATE 50; 200; 25 MG/1; MG/1; MG/1
1 TABLET ORAL DAILY
Qty: 90 TABLET | Refills: 1 | Status: SHIPPED | OUTPATIENT
Start: 2024-11-04

## 2024-11-04 RX ORDER — TRAZODONE HYDROCHLORIDE 50 MG/1
50 TABLET ORAL NIGHTLY
Qty: 90 TABLET | Refills: 3 | Status: SHIPPED | OUTPATIENT
Start: 2024-11-04 | End: 2025-11-04

## 2024-11-04 RX ORDER — TRAZODONE HYDROCHLORIDE 50 MG/1
50 TABLET ORAL NIGHTLY
Qty: 30 TABLET | Refills: 3 | Status: SHIPPED | OUTPATIENT
Start: 2024-11-04 | End: 2024-11-04 | Stop reason: SDUPTHER

## 2024-11-04 RX ORDER — FLUOXETINE 10 MG/1
10 CAPSULE ORAL DAILY
Qty: 30 CAPSULE | Refills: 11 | Status: SHIPPED | OUTPATIENT
Start: 2024-11-04 | End: 2024-11-04 | Stop reason: SDUPTHER

## 2024-11-04 RX ADMIN — HEPATITIS A VACCINE 1440 UNITS: 1440 INJECTION, SUSPENSION INTRAMUSCULAR at 08:11

## 2024-11-04 RX ADMIN — INFLUENZA VIRUS VACCINE 0.5 ML: 15; 15; 15 SUSPENSION INTRAMUSCULAR at 08:11

## 2024-11-04 NOTE — PROGRESS NOTES
Patient ID: Jacob Gee 34 y.o.     Chief Complaint:   Chief Complaint   Patient presents with    Followup HIV     Denies problems        HPI:    11/4/24  Jacob is a 35 yo WM here today for HIV f/u visit. He is taking Biktarvy daily and is tolerating it well. Labs 7/24 VL UD, CD4 591.  RPR titer decreased to 2 dils. Urine CT/GC negative. No new sexual partners > 1 year. Remains in relationship with Hakeem and will be moving to [place] in 1 month with him. He tells me that trazodone 50 mg did initially work well for sleep, but has now lost its efficacy. However, he has found it to be effective at decreasing anxiety/depressive tendencies. He was previously on fluoxetine in rehab and found that it worked well for him. Appreciates offer to start on low dose fluoxetine for anxiety/depression and will use trazodone PRN sleep. He has stopped vitamin d supplement as advised after last labs with level at 98.  He does get sunlight and will repeat level with next annual lab panel. Last eye exam 6/2023. He appreciates flu vaccine & 1st dose of Hep A vaccine today. All questions answered & concerns addressed.    7/23/24  Jacob is a 35 yo WM evaluated today via audio-video virtual visit for HIV f/u visit.  He switched to Biktarvy as discussed last visit and is tolerating it fine. He has noted that he needs to eat a small snack with it, otherwise will upset his stomach. Last labs 4/24 VL UD, CD4 422, RPR 4 dils.  Denies any new sexual partners, still in relationship with Hakeem. Valtrex effective for outbreaks, does not need refills at this  time.  He does still have trouble with staying asleep but does not like using the hydroxyzine as he feels like it causes a hangover effect in the morning. Has uses trazodone in rehab in the past & tolerated the 50 mg dose very well. Will send prescription for same as requested. He will come to [facility] today for routine labs as well. All questions answered & concerns addressed.     3/12/24  Jacob  is a 34 yo WM evaluated today via audio-video virtual visit for HIV f/u.  He has been taking Genvoya daily with viral suppression, tolerates well. Labs 11/23 VL UD, 7/23 Cd4 674.  Discussed opportunity for enhancer free STR with excellent viral control and improved safety profile. He is amenable to ART switch to Biktarvy, tells me that he does have several months of surplus of Genvoya at home. Will complete medication stock, then start Biktarvy. He also states that he did have a couple of HSV outbreaks since last visit s/t increased stressors and had to use the Valtrex. Appreciates refill of same. Taking vitamin D3 daily, purchases OTC. Immunizations are UTD. He and Hakeem are doing well, no outside sexual partners. Will come in this week for routine labs. All questions answered & concerns addressed.           Past Medical History:   Diagnosis Date    Allergy     HIV infection         History reviewed. No pertinent surgical history.     Social History     Socioeconomic History    Marital status: Single   Tobacco Use    Smoking status: Every Day     Types: Vaping with nicotine    Smokeless tobacco: Never    Tobacco comments:     5% nicotine daily   Substance and Sexual Activity    Alcohol use: Not Currently     Comment: denies    Drug use: Yes     Types: Marijuana     Comment: Social    Sexual activity: Yes     Partners: Male     Birth control/protection: None     Comment: /Monogomous   Social History Narrative    ** Merged History Encounter **             Family History   Problem Relation Name Age of Onset    No Known Problems Mother      No Known Problems Father      No Known Problems Sister      No Known Problems Brother          Review of patient's allergies indicates:  No Known Allergies     Immunization History   Administered Date(s) Administered    COVID-19, MRNA, LN-S, PF (Pfizer) (Purple Cap) 03/20/2021, 04/10/2021, 11/29/2021    COVID-19, mRNA, LNP-S, bivalent booster, PF (PFIZER OMICRON) 03/22/2023     "DTP 1990, 1990, 07/25/1991, 04/02/1992, 05/04/1995    HIB 1990, 07/25/1991, 04/02/1992    HPV 9-Valent 03/01/2017, 06/06/2017, 06/06/2017, 02/14/2018    Hepatitis A, Adult 07/17/2015, 11/04/2024    Hepatitis B, Pediatric/Adolescent 02/08/2008, 03/07/2008, 06/27/2008    Influenza 02/14/2018    Influenza (FLUBLOK) - Quadrivalent - Recombinant - PF *Preferred* (egg allergy) 02/14/2018    Influenza - Intranasal - Trivalent 10/22/2008    Influenza - Quadrivalent 12/28/2016, 10/07/2020    Influenza - Quadrivalent - MDCK - PF 11/29/2021    Influenza - Quadrivalent - PF (6-35 months) 02/14/2019    Influenza - Quadrivalent - PF *Preferred* (6 months and older) 10/07/2020, 12/05/2022, 11/14/2023    Influenza - Trivalent - Fluarix, Flulaval, Fluzone, Afluria - PF 10/10/2019, 11/29/2021, 11/04/2024    MMR 04/02/1992, 05/04/1995    Meningococcal Conjugate (MCV4P) 02/08/2008, 08/29/2018, 02/14/2019    OPV 1990, 1990, 04/02/1992, 05/04/1995    Pneumococcal Conjugate - 13 Valent 07/17/2015, 07/17/2015    Pneumococcal Polysaccharide - 23 Valent 09/28/2015, 02/01/2021    Tdap 02/08/2008, 09/28/2015        Review of Systems   Constitutional: Negative.    HENT: Negative.     Eyes: Negative.    Respiratory: Negative.     Cardiovascular: Negative.    Gastrointestinal: Negative.    Genitourinary: Negative.    Musculoskeletal: Negative.    Skin: Negative.    Neurological: Negative.    Endo/Heme/Allergies: Negative.    Psychiatric/Behavioral:  The patient is nervous/anxious.    All other systems reviewed and are negative.         Objective:      BP (!) 97/58 (BP Location: Left arm, Patient Position: Sitting)   Pulse 62   Temp 98.3 °F (36.8 °C) (Oral)   Resp 12   Ht 5' 11" (1.803 m)   Wt 79.4 kg (175 lb 0.7 oz)   BMI 24.41 kg/m²      Physical Exam  Vitals reviewed.   Constitutional:       General: He is not in acute distress.     Appearance: Normal appearance. He is not toxic-appearing.   HENT:      " Mouth/Throat:      Mouth: Mucous membranes are moist.      Pharynx: Oropharynx is clear.   Eyes:      Conjunctiva/sclera: Conjunctivae normal.   Cardiovascular:      Rate and Rhythm: Normal rate and regular rhythm.   Pulmonary:      Effort: Pulmonary effort is normal. No respiratory distress.      Breath sounds: Normal breath sounds.   Abdominal:      General: Abdomen is flat. Bowel sounds are normal.      Palpations: Abdomen is soft.   Musculoskeletal:         General: Normal range of motion.      Cervical back: Normal range of motion.   Lymphadenopathy:      Cervical: No cervical adenopathy.   Skin:     General: Skin is warm and dry.   Neurological:      General: No focal deficit present.      Mental Status: He is alert and oriented to person, place, and time. Mental status is at baseline.   Psychiatric:         Mood and Affect: Mood normal.         Behavior: Behavior normal.          Labs:   Lab Results   Component Value Date    WBC 11.67 (H) 07/23/2024    HGB 13.9 (L) 07/23/2024    HCT 40.3 (L) 07/23/2024    MCV 97.3 (H) 07/23/2024     07/23/2024       CMP  Sodium   Date Value Ref Range Status   11/04/2024 137 136 - 145 mmol/L Final     Potassium   Date Value Ref Range Status   11/04/2024 4.4 3.5 - 5.1 mmol/L Final     Chloride   Date Value Ref Range Status   11/04/2024 106 98 - 107 mmol/L Final     CO2   Date Value Ref Range Status   11/04/2024 29 22 - 29 mmol/L Final     Blood Urea Nitrogen   Date Value Ref Range Status   11/04/2024 18.7 8.9 - 20.6 mg/dL Final     Creatinine   Date Value Ref Range Status   11/04/2024 1.18 0.72 - 1.25 mg/dL Final     Calcium   Date Value Ref Range Status   11/04/2024 9.1 8.4 - 10.2 mg/dL Final     Albumin   Date Value Ref Range Status   11/04/2024 4.2 3.5 - 5.0 g/dL Final     Bilirubin Total   Date Value Ref Range Status   11/04/2024 0.3 <=1.5 mg/dL Final     ALP   Date Value Ref Range Status   11/04/2024 34 (L) 40 - 150 unit/L Final     AST   Date Value Ref Range Status    11/04/2024 23 5 - 34 unit/L Final     ALT   Date Value Ref Range Status   11/04/2024 20 0 - 55 unit/L Final     eGFR   Date Value Ref Range Status   11/04/2024 >60 mL/min/1.73/m2 Final     Lab Results   Component Value Date    TSH 1.761 07/23/2024     Hep C Ab Interp   Date Value Ref Range Status   07/23/2024 Nonreactive Nonreactive Final     Syphilis Antibody   Date Value Ref Range Status   07/23/2024 Reactive (A) Nonreactive, Equivocal Final     RPR   Date Value Ref Range Status   07/23/2024 Reactive (A) Non-Reactive Final     RPR Titer   Date Value Ref Range Status   07/23/2024 2 dils (A) (none) Final     Cholesterol Total   Date Value Ref Range Status   07/23/2024 151 <=200 mg/dL Final     HDL Cholesterol   Date Value Ref Range Status   07/23/2024 54 35 - 60 mg/dL Final     Triglyceride   Date Value Ref Range Status   07/23/2024 39 34 - 140 mg/dL Final     Cholesterol/HDL Ratio   Date Value Ref Range Status   07/23/2024 3 0 - 5 Final     Very Low Density Lipoprotein   Date Value Ref Range Status   07/23/2024 8  Final     LDL Cholesterol   Date Value Ref Range Status   07/23/2024 89.00 50.00 - 140.00 mg/dL Final     Vitamin D   Date Value Ref Range Status   07/23/2024 98 (H) 30 - 80 ng/mL Final     Results for orders placed or performed in visit on 07/23/24   CD4 Lymphocytes   Result Value Ref Range    Patient Age 34     WBC Absolute 11,670 (H) 4,500 - 11,500 /mm3    Lymph Percent 11 (L) 28 - 48 %    Lymph Absolute 1,283.7 1,260 - 5,520 x10(3)/mcL    CD4 % 46.0 %    CD4 Absolute 591 589 - 1,505 unit/L    T Cell Interp       Normal absolute lymphocyte count with normal absolute CD4+ lymphocyte count.    Hakeem Vicente M.D.     Narrative    This test was developed and its performance characteristics determined by [facility]. It has not been cleared or approved by the US Food and Drug Administration. The FDA does not require this test to go through premarket FDA review. This test  is used for clinical purposes. It should not be regarded as investigational or for research. This laboratory is certified under the Clinical Laboratory Improvement Amendments (CLIA) as qualified to perform high complexity clinical laboratory testing.     Results for orders placed or performed in visit on 07/23/24   HIV-1 RNA, Quantitative, PCR with Reflex to Genotype   Result Value Ref Range    HIV-1 RNA Detect/Quant, P Undetected Undetected copies/mL     Results for orders placed or performed in visit on 08/14/24   Quantiferon Gold TB   Result Value Ref Range    QuantiFERON-Tb Gold Plus Result Negative Negative    TB1 Ag minus Nil Result 0.00 IU/mL    TB2 Ag minus Nil Result 0.00 IU/mL    Mitogen minus Nil Result 9.26 IU/mL    Nil Result 0.03 IU/mL     Results for orders placed or performed in visit on 07/18/23   C.trach/N.gonor AMP RNA    Specimen: Throat   Result Value Ref Range    Chlamydia trachomatis amplified RNA Negative Negative    Neisseria gonorrhoeae amplified RNA Negative Negative    Source oral/throat     SOURCE: oral/throat      Results for orders placed or performed in visit on 07/23/24   Urinalysis   Result Value Ref Range    Color, UA Light-Yellow Yellow, Light-Yellow, Dark Yellow, Zo, Straw    Appearance, UA Clear Clear    Specific Gravity, UA 1.016 1.005 - 1.030    pH, UA 6.0 5.0 - 8.5    Protein, UA Negative Negative    Glucose, UA Normal Negative, Normal    Ketones, UA Negative Negative    Blood, UA Negative Negative    Bilirubin, UA Negative Negative    Urobilinogen, UA Normal 0.2, 1.0, Normal    Nitrites, UA Negative Negative    Leukocyte Esterase, UA Negative Negative    RBC, UA 0-5 None Seen, 0-2, 3-5, 0-5 /HPF    WBC, UA 0-5 None Seen, 0-2, 3-5, 0-5 /HPF    Bacteria, UA None Seen None Seen /HPF    Squamous Epithelial Cells, UA Trace (A) None Seen /HPF    Mucous, UA Trace (A) None Seen /LPF    Hyaline Casts, UA None Seen None Seen /lpf       Imaging: Reviewed most recent relevant imaging  studies available, notable results highlighted in this note    Medications:     Current Outpatient Medications   Medication Instructions    xmwwlgbzt-phsvbaft-cdexrsn ala (BIKTARVY) -25 mg (25 kg or greater) 1 tablet, Oral, Daily    FLUoxetine 10 mg, Oral, Daily    loratadine (CLARITIN) 10 mg, Oral, Daily    traZODone (DESYREL) 50 mg, Oral, Nightly    valACYclovir (VALTREX) 1,000 mg, Oral, 2 times daily       Assessment:       Problem List Items Addressed This Visit          ID    HIV disease - Primary    Relevant Medications    fxgguiiqu-tlqvwpmq-tofyund ala (BIKTARVY) -25 mg (25 kg or greater)    Other Relevant Orders    HIV-1 RNA, Quantitative, PCR with Reflex to Genotype    Comprehensive Metabolic Panel     Other Visit Diagnoses       Insomnia, unspecified type        Relevant Medications    traZODone (DESYREL) 50 MG tablet    Need for vaccination        Relevant Medications    Hep A (Havrix) IM vaccine (>/= 20 yo) (Completed)    influenza (Flulaval, Fluzone, Fluarix) 45 mcg/0.5 mL IM vaccine (> or = 6 mo) 0.5 mL (Completed)    Depression with anxiety        Relevant Medications    FLUoxetine 10 MG capsule               Plan:      HIV disease  -     ikhmkfkhr-kojwkuvz-qkpguog ala (BIKTARVY) -25 mg (25 kg or greater); Take 1 tablet by mouth once daily.  Dispense: 90 tablet; Refill: 1  -     HIV-1 RNA, Quantitative, PCR with Reflex to Genotype; Future; Expected date: 11/04/2024  -     Comprehensive Metabolic Panel; Future; Expected date: 11/04/2024  Diagnosed 2/10/2015 at [facility].  Baseline VL [id number], pan-sensitive virus (3/2015).  CD4 yashira 446.  History of OI: none.  HLA- negative 3/2015.  ART history: Stribild, Genvoya, Biktarvy.  STI history: syphilis (128 dils 2015), HSV    Adherence and sexual health counseling done.  Use condoms for all sexual encounters.  Blood precautions.   Continue Biktarvy 1 po daily.  Labs this week.  RTC 4 months with Margaux, in clinic.     HIV  Wellness:  Anal pap: 8/22 NIL  Oral CT/GC: 8/22 Neg, 7/23 Neg  Anal CT/GC: 8/22 Neg, 7/23 Neg  Urine CT/GC: 8/22 Neg, 7/23 Neg, 7/24 Neg  RPR: 8/22 1:8, 12/22 16 dils (treated), 4/23 8 dils, 7/23 4 dils, 4/24 4 dils, 7/24 2 dils  Ophth: 6/23    Insomnia, unspecified type  -     Discontinue: traZODone (DESYREL) 50 MG tablet; Take 1 tablet (50 mg total) by mouth every evening.  Dispense: 30 tablet; Refill: 3  -     traZODone (DESYREL) 50 MG tablet; Take 1 tablet (50 mg total) by mouth every evening.  Dispense: 90 tablet; Refill: 3  Trazodone 50 mg at bedtime PRN sleep.     Need for vaccination  -     Hep A (Havrix) IM vaccine (>/= 18 yo)  -     influenza (Flulaval, Fluzone, Fluarix) 45 mcg/0.5 mL IM vaccine (> or = 6 mo) 0.5 mL  Hep A #1 today, #2 due 5/25-11/25.  Flu vaccine today.     Depression with anxiety  -     Discontinue: FLUoxetine 10 MG capsule; Take 1 capsule (10 mg total) by mouth once daily.  Dispense: 30 capsule; Refill: 11  -     FLUoxetine 10 MG capsule; Take 1 capsule (10 mg total) by mouth once daily.  Dispense: 90 capsule; Refill: 3  Fluoxetine 10 mg daily.   Notify me for any concerns or needs.

## 2024-11-04 NOTE — PROGRESS NOTES
Hepatitis A vaccination given IM left deltoid using aseptic tech and Influenza Vaccination given IM right deltoid using aseptic tech. Patient tolerated well. To contact clinic prn.

## 2024-11-05 LAB — HIV1 RNA # PLAS NAA DL=20: NORMAL COPIES/ML

## 2025-01-20 DIAGNOSIS — F41.8 DEPRESSION WITH ANXIETY: ICD-10-CM

## 2025-01-21 RX ORDER — FLUOXETINE 10 MG/1
10 CAPSULE ORAL DAILY
Qty: 90 CAPSULE | Refills: 3 | Status: SHIPPED | OUTPATIENT
Start: 2025-01-21 | End: 2026-01-21

## 2025-03-11 ENCOUNTER — PATIENT MESSAGE (OUTPATIENT)
Dept: INFECTIOUS DISEASES | Facility: CLINIC | Age: 35
End: 2025-03-11
Payer: COMMERCIAL

## 2025-03-11 ENCOUNTER — OFFICE VISIT (OUTPATIENT)
Dept: INFECTIOUS DISEASES | Facility: CLINIC | Age: 35
End: 2025-03-11
Payer: COMMERCIAL

## 2025-03-11 DIAGNOSIS — Z21 ASYMPTOMATIC HIV INFECTION, WITH NO HISTORY OF HIV-RELATED ILLNESS: ICD-10-CM

## 2025-03-11 DIAGNOSIS — E55.9 VITAMIN D DEFICIENCY: Primary | ICD-10-CM

## 2025-03-11 DIAGNOSIS — Z86.19 HISTORY OF SYPHILIS: ICD-10-CM

## 2025-03-11 RX ORDER — BICTEGRAVIR SODIUM, EMTRICITABINE, AND TENOFOVIR ALAFENAMIDE FUMARATE 50; 200; 25 MG/1; MG/1; MG/1
1 TABLET ORAL DAILY
Qty: 90 TABLET | Refills: 1 | Status: SHIPPED | OUTPATIENT
Start: 2025-03-11

## 2025-03-11 NOTE — PROGRESS NOTES
Patient ID: Jacob Gee 34 y.o.   Patient and provider are located in the [facility].    Face to Face time with patient:  30 minutes of total time spent on the encounter, which includes face to face time and non-face to face time preparing to see the patient (eg, review of tests), Obtaining and/or reviewing separately obtained history, Documenting clinical information in the electronic or other health record, Independently interpreting results (not separately reported) and communicating results to the patient/family/caregiver, or Care coordination (not separately reported).     Each patient to whom he or she provides medical services by telemedicine is:  (1) informed of the relationship between the physician and patient and the respective role of any other health care provider with respect to management of the patient; and (2) notified that he or she may decline to receive medical services by telemedicine and may withdraw from such care at any time.  Chief Complaint:   Chief Complaint   Patient presents with    Followup HIV     Denies problems        HPI:    3/11/25  Jacob is a 35 yo WM evaluated today via audio-video virtual visit for f/u. He has moved to [place] with Hakeem and is doing very well.  Takes Biktarvy daily, last labs 11/24 VL UD, 7/24 CD4 591, RPR 2 dils. Will have labs collected next week when he comes to visit grandfather. He tells me that the fluoxetine daily with trazodone PRN is working perfectly for him. Denies any need for STI screenings at this time. Continues to hold vitamin D supplement, will check level for re-evaluation. Will be due for Hep A vaccine 2nd dose next visit, will plan for in office appointment.  Doing well and has no concerns or questions at this time.     11/4/24  Jacob is a 35 yo WM here today for HIV f/u visit. He is taking Biktarvy daily and is tolerating it well. Labs 7/24 VL UD, CD4 591.  RPR titer decreased to 2 dils. Urine CT/GC negative. No new sexual  partners > 1 year. Remains in relationship with Hakeem and will be moving to [place] in 1 month with him. He tells me that trazodone 50 mg did initially work well for sleep, but has now lost its efficacy. However, he has found it to be effective at decreasing anxiety/depressive tendencies. He was previously on fluoxetine in rehab and found that it worked well for him. Appreciates offer to start on low dose fluoxetine for anxiety/depression and will use trazodone PRN sleep. He has stopped vitamin d supplement as advised after last labs with level at 98.  He does get sunlight and will repeat level with next annual lab panel. Last eye exam 6/2023. He appreciates flu vaccine & 1st dose of Hep A vaccine today. All questions answered & concerns addressed.     7/23/24  Jacob is a 33 yo WM evaluated today via audio-video virtual visit for HIV f/u visit.  He switched to Biktarvy as discussed last visit and is tolerating it fine. He has noted that he needs to eat a small snack with it, otherwise will upset his stomach. Last labs 4/24 VL UD, CD4 422, RPR 4 dils.  Denies any new sexual partners, still in relationship with Hakeem. Valtrex effective for outbreaks, does not need refills at this  time.  He does still have trouble with staying asleep but does not like using the hydroxyzine as he feels like it causes a hangover effect in the morning. Has uses trazodone in rehab in the past & tolerated the 50 mg dose very well. Will send prescription for same as requested. He will come to [facility] for routine labs as well. All questions answered & concerns addressed.           Past Medical History:   Diagnosis Date    Allergy     HIV infection         History reviewed. No pertinent surgical history.     Social History     Socioeconomic History    Marital status: Single   Tobacco Use    Smoking status: Every Day     Types: Vaping with nicotine    Smokeless tobacco: Never    Tobacco comments:     5% nicotine daily   Substance and  Sexual Activity    Alcohol use: Not Currently     Comment: denies    Drug use: Yes     Types: Marijuana     Comment: Social    Sexual activity: Yes     Partners: Male     Birth control/protection: None     Comment: /Monogomous   Social History Narrative    ** Merged History Encounter **             Family History   Problem Relation Name Age of Onset    No Known Problems Mother      No Known Problems Father      No Known Problems Sister      No Known Problems Brother          Review of patient's allergies indicates:  No Known Allergies     Immunization History   Administered Date(s) Administered    COVID-19, MRNA, LN-S, PF (Pfizer) (Purple Cap) 03/20/2021, 04/10/2021, 11/29/2021    COVID-19, mRNA, LNP-S, bivalent booster, PF (PFIZER OMICRON) 03/22/2023    DTP 1990, 1990, 07/25/1991, 04/02/1992, 05/04/1995    HIB 1990, 07/25/1991, 04/02/1992    HPV 9-Valent 03/01/2017, 06/06/2017, 06/06/2017, 02/14/2018    Hepatitis A, Adult 07/17/2015, 11/04/2024    Hepatitis B, Pediatric/Adolescent 02/08/2008, 03/07/2008, 06/27/2008    Influenza 02/14/2018    Influenza (FLUBLOK) - Quadrivalent - Recombinant - PF *Preferred* (egg allergy) 02/14/2018    Influenza - Intranasal - Trivalent 10/22/2008    Influenza - Quadrivalent 12/28/2016, 10/07/2020    Influenza - Quadrivalent - MDCK - PF 11/29/2021    Influenza - Quadrivalent - PF (6-35 months) 02/14/2019    Influenza - Quadrivalent - PF *Preferred* (6 months and older) 10/07/2020, 12/05/2022, 11/14/2023    Influenza - Trivalent - Fluarix, Flulaval, Fluzone, Afluria - PF 10/10/2019, 11/29/2021, 11/04/2024    MMR 04/02/1992, 05/04/1995    Meningococcal Conjugate (MCV4P) 02/08/2008, 08/29/2018, 02/14/2019    OPV 1990, 1990, 04/02/1992, 05/04/1995    Pneumococcal Conjugate - 13 Valent 07/17/2015, 07/17/2015    Pneumococcal Polysaccharide - 23 Valent 09/28/2015, 02/01/2021    Tdap 02/08/2008, 09/28/2015        Review of Systems   Constitutional:  Negative.    HENT: Negative.     Eyes: Negative.    Respiratory: Negative.     Cardiovascular: Negative.    Gastrointestinal: Negative.    Genitourinary: Negative.    Musculoskeletal: Negative.    Skin: Negative.    Neurological: Negative.    Endo/Heme/Allergies: Negative.    Psychiatric/Behavioral: Negative.     All other systems reviewed and are negative.         Objective:      There were no vitals taken for this visit.     Physical Exam  Constitutional:       General: He is not in acute distress.     Appearance: Normal appearance.   HENT:      Head: Normocephalic.   Eyes:      Conjunctiva/sclera: Conjunctivae normal.   Pulmonary:      Effort: Pulmonary effort is normal.   Musculoskeletal:         General: Normal range of motion.      Cervical back: Normal range of motion.   Neurological:      General: No focal deficit present.      Mental Status: He is alert and oriented to person, place, and time. Mental status is at baseline.   Psychiatric:         Mood and Affect: Mood normal.         Behavior: Behavior normal.         Thought Content: Thought content normal.         Judgment: Judgment normal.          Labs:   Lab Results   Component Value Date    WBC 11.67 (H) 07/23/2024    HGB 13.9 (L) 07/23/2024    HCT 40.3 (L) 07/23/2024    MCV 97.3 (H) 07/23/2024     07/23/2024       CMP  Sodium   Date Value Ref Range Status   11/04/2024 137 136 - 145 mmol/L Final     Potassium   Date Value Ref Range Status   11/04/2024 4.4 3.5 - 5.1 mmol/L Final     Chloride   Date Value Ref Range Status   11/04/2024 106 98 - 107 mmol/L Final     CO2   Date Value Ref Range Status   11/04/2024 29 22 - 29 mmol/L Final     Blood Urea Nitrogen   Date Value Ref Range Status   11/04/2024 18.7 8.9 - 20.6 mg/dL Final     Creatinine   Date Value Ref Range Status   11/04/2024 1.18 0.72 - 1.25 mg/dL Final     Calcium   Date Value Ref Range Status   11/04/2024 9.1 8.4 - 10.2 mg/dL Final     Albumin   Date Value Ref Range Status   11/04/2024  4.2 3.5 - 5.0 g/dL Final     Bilirubin Total   Date Value Ref Range Status   11/04/2024 0.3 <=1.5 mg/dL Final     ALP   Date Value Ref Range Status   11/04/2024 34 (L) 40 - 150 unit/L Final     AST   Date Value Ref Range Status   11/04/2024 23 5 - 34 unit/L Final     ALT   Date Value Ref Range Status   11/04/2024 20 0 - 55 unit/L Final     eGFR   Date Value Ref Range Status   11/04/2024 >60 mL/min/1.73/m2 Final     Lab Results   Component Value Date    TSH 1.761 07/23/2024     Hep C Ab Interp   Date Value Ref Range Status   07/23/2024 Nonreactive Nonreactive Final     Syphilis Antibody   Date Value Ref Range Status   07/23/2024 Reactive (A) Nonreactive, Equivocal Final     RPR   Date Value Ref Range Status   07/23/2024 Reactive (A) Non-Reactive Final     RPR Titer   Date Value Ref Range Status   07/23/2024 2 dils (A) (none) Final     Cholesterol Total   Date Value Ref Range Status   07/23/2024 151 <=200 mg/dL Final     HDL Cholesterol   Date Value Ref Range Status   07/23/2024 54 35 - 60 mg/dL Final     Triglyceride   Date Value Ref Range Status   07/23/2024 39 34 - 140 mg/dL Final     Cholesterol/HDL Ratio   Date Value Ref Range Status   07/23/2024 3 0 - 5 Final     Very Low Density Lipoprotein   Date Value Ref Range Status   07/23/2024 8  Final     LDL Cholesterol   Date Value Ref Range Status   07/23/2024 89.00 50.00 - 140.00 mg/dL Final     Vitamin D   Date Value Ref Range Status   07/23/2024 98 (H) 30 - 80 ng/mL Final     Results for orders placed or performed in visit on 07/23/24   CD4 Lymphocytes   Result Value Ref Range    Patient Age 34     WBC Absolute 11,670 (H) 4,500 - 11,500 /mm3    Lymph Percent 11 (L) 28 - 48 %    Lymph Absolute 1,283.7 1,260 - 5,520 x10(3)/mcL    CD4 % 46.0 %    CD4 Absolute 591 589 - 1,505 unit/L    T Cell Interp       Normal absolute lymphocyte count with normal absolute CD4+ lymphocyte count.    Hakeem Vicente M.D.     Narrative    This test was developed and its performance  characteristics determined by [facility]. It has not been cleared or approved by the US Food and Drug Administration. The FDA does not require this test to go through premarket FDA review. This test is used for clinical purposes. It should not be regarded as investigational or for research. This laboratory is certified under the Clinical Laboratory Improvement Amendments (CLIA) as qualified to perform high complexity clinical laboratory testing.     Results for orders placed or performed in visit on 11/04/24   HIV-1 RNA, Quantitative, PCR with Reflex to Genotype   Result Value Ref Range    HIV-1 RNA Detect/Quant, P Undetected Undetected copies/mL     Results for orders placed or performed in visit on 08/14/24   Quantiferon Gold TB   Result Value Ref Range    QuantiFERON-Tb Gold Plus Result Negative Negative    TB1 Ag minus Nil Result 0.00 IU/mL    TB2 Ag minus Nil Result 0.00 IU/mL    Mitogen minus Nil Result 9.26 IU/mL    Nil Result 0.03 IU/mL     Results for orders placed or performed in visit on 07/18/23   C.trach/N.gonor AMP RNA    Specimen: Throat   Result Value Ref Range    Chlamydia trachomatis amplified RNA Negative Negative    Neisseria gonorrhoeae amplified RNA Negative Negative    Source oral/throat     SOURCE: oral/throat      Results for orders placed or performed in visit on 07/23/24   Urinalysis   Result Value Ref Range    Color, UA Light-Yellow Yellow, Light-Yellow, Dark Yellow, Zo, Straw    Appearance, UA Clear Clear    Specific Gravity, UA 1.016 1.005 - 1.030    pH, UA 6.0 5.0 - 8.5    Protein, UA Negative Negative    Glucose, UA Normal Negative, Normal    Ketones, UA Negative Negative    Blood, UA Negative Negative    Bilirubin, UA Negative Negative    Urobilinogen, UA Normal 0.2, 1.0, Normal    Nitrites, UA Negative Negative    Leukocyte Esterase, UA Negative Negative    RBC, UA 0-5 None Seen, 0-2, 3-5, 0-5 /HPF    WBC, UA 0-5 None Seen, 0-2, 3-5, 0-5 /HPF     Bacteria, UA None Seen None Seen /HPF    Squamous Epithelial Cells, UA Trace (A) None Seen /HPF    Mucous, UA Trace (A) None Seen /LPF    Hyaline Casts, UA None Seen None Seen /lpf       Imaging: Reviewed most recent relevant imaging studies available, notable results highlighted in this note    Medications:     Current Outpatient Medications   Medication Instructions    ykacqiwyd-yyazpyuj-zktpyyp ala (BIKTARVY) -25 mg (25 kg or greater) 1 tablet, Oral, Daily    FLUoxetine 10 mg, Oral, Daily    loratadine (CLARITIN) 10 mg, Oral, Daily    traZODone (DESYREL) 50 mg, Oral, Nightly    valACYclovir (VALTREX) 1,000 mg, Oral, 2 times daily       Assessment:       Problem List Items Addressed This Visit       History of syphilis    Relevant Orders    SYPHILIS ANTIBODY (WITH REFLEX RPR)    Vitamin D deficiency - Primary    Relevant Orders    Vitamin D     Other Visit Diagnoses         Asymptomatic HIV infection, with no history of HIV-related illness        Relevant Medications    eansycfnq-klogdclc-qbuwpyf ala (BIKTARVY) -25 mg (25 kg or greater)    Other Relevant Orders    Comprehensive Metabolic Panel    CD4 Lymphocytes    CBC Auto Differential    HIV-1 RNA, Quantitative, PCR with Reflex to Genotype               Plan:      Asymptomatic HIV infection, with no history of HIV-related illness  -     zgjrgduik-ezyukdsz-gsyodrz ala (BIKTARVY) -25 mg (25 kg or greater); Take 1 tablet by mouth once daily.  Dispense: 90 tablet; Refill: 1  -     Comprehensive Metabolic Panel; Future; Expected date: 03/11/2025  -     CD4 Lymphocytes; Future; Expected date: 03/11/2025  -     CBC Auto Differential; Future; Expected date: 03/11/2025  -     HIV-1 RNA, Quantitative, PCR with Reflex to Genotype; Future; Expected date: 03/11/2025  Diagnosed 2/10/2015 at [facility].  Baseline VL 899949, pan-sensitive virus (3/2015).  CD4 yashira 446.  History of OI: none.  HLA- negative 3/2015.  ART history: Stribild,  Genvoya, Biktarvy.  STI history: syphilis (128 dils 2015), HSV     Adherence and sexual health counseling done.  Use condoms for all sexual encounters.  Blood precautions.   Continue Biktarvy 1 po daily.  Labs next week.  RTC 4 months with Margaux in clinic.  Hep A #2 next visit.      HIV Wellness:  Anal pap: 8/22 NIL  Oral CT/GC: 8/22 Neg, 7/23 Neg  Anal CT/GC: 8/22 Neg, 7/23 Neg  Urine CT/GC: 8/22 Neg, 7/23 Neg, 7/24 Neg  RPR: 8/22 1:8, 12/22 16 dils (treated), 4/23 8 dils, 7/23 4 dils, 4/24 4 dils, 7/24 2 dils  Ophth: 6/23    History of syphilis  -     SYPHILIS ANTIBODY (WITH REFLEX RPR); Future; Expected date: 03/11/2025  Baseline titer 128 dils 2015.  Serofast at 2 dils 7/24.  Repeat titer with labs as ordered.     Vitamin D deficiency  -     Vitamin D; Future; Expected date: 03/11/2025   Supplement on hold due to hypervitaminosis.   Repeat level for re-evaluation.     Depression with anxiety  Improved.  Continue Fluoxetine 10 mg daily.     Insomnia, unspecified type  Controlled.   Continue Trazodone 50 mg at bedtime PRN sleep.

## 2025-03-19 ENCOUNTER — LAB VISIT (OUTPATIENT)
Dept: LAB | Facility: HOSPITAL | Age: 35
End: 2025-03-19
Attending: NURSE PRACTITIONER
Payer: COMMERCIAL

## 2025-03-19 DIAGNOSIS — Z21 ASYMPTOMATIC HIV INFECTION, WITH NO HISTORY OF HIV-RELATED ILLNESS: ICD-10-CM

## 2025-03-19 DIAGNOSIS — E55.9 VITAMIN D DEFICIENCY: ICD-10-CM

## 2025-03-19 DIAGNOSIS — Z86.19 HISTORY OF SYPHILIS: ICD-10-CM

## 2025-03-19 LAB
25(OH)D3+25(OH)D2 SERPL-MCNC: 41 NG/ML (ref 30–80)
ALBUMIN SERPL-MCNC: 4.1 G/DL (ref 3.5–5)
ALBUMIN/GLOB SERPL: 1.6 RATIO (ref 1.1–2)
ALP SERPL-CCNC: 36 UNIT/L (ref 40–150)
ALT SERPL-CCNC: 24 UNIT/L (ref 0–55)
ANION GAP SERPL CALC-SCNC: 4 MEQ/L
AST SERPL-CCNC: 27 UNIT/L (ref 5–34)
BASOPHILS # BLD AUTO: 0.03 X10(3)/MCL
BASOPHILS NFR BLD AUTO: 0.4 %
BILIRUB SERPL-MCNC: 0.4 MG/DL
BUN SERPL-MCNC: 21.1 MG/DL (ref 8.9–20.6)
CALCIUM SERPL-MCNC: 9 MG/DL (ref 8.4–10.2)
CHLORIDE SERPL-SCNC: 107 MMOL/L (ref 98–107)
CO2 SERPL-SCNC: 27 MMOL/L (ref 22–29)
CREAT SERPL-MCNC: 0.99 MG/DL (ref 0.72–1.25)
CREAT/UREA NIT SERPL: 21
EOSINOPHIL # BLD AUTO: 0.01 X10(3)/MCL (ref 0–0.9)
EOSINOPHIL NFR BLD AUTO: 0.1 %
ERYTHROCYTE [DISTWIDTH] IN BLOOD BY AUTOMATED COUNT: 13.2 % (ref 11.5–17)
GFR SERPLBLD CREATININE-BSD FMLA CKD-EPI: >60 ML/MIN/1.73/M2
GLOBULIN SER-MCNC: 2.6 GM/DL (ref 2.4–3.5)
GLUCOSE SERPL-MCNC: 113 MG/DL (ref 74–100)
HCT VFR BLD AUTO: 39.3 % (ref 42–52)
HGB BLD-MCNC: 13.2 G/DL (ref 14–18)
IMM GRANULOCYTES # BLD AUTO: 0.01 X10(3)/MCL (ref 0–0.04)
IMM GRANULOCYTES NFR BLD AUTO: 0.1 %
LYMPHOCYTES # BLD AUTO: 1.43 X10(3)/MCL (ref 0.6–4.6)
LYMPHOCYTES NFR BLD AUTO: 20.7 %
MCH RBC QN AUTO: 33.2 PG (ref 27–31)
MCHC RBC AUTO-ENTMCNC: 33.6 G/DL (ref 33–36)
MCV RBC AUTO: 98.7 FL (ref 80–94)
MONOCYTES # BLD AUTO: 0.68 X10(3)/MCL (ref 0.1–1.3)
MONOCYTES NFR BLD AUTO: 9.9 %
NEUTROPHILS # BLD AUTO: 4.74 X10(3)/MCL (ref 2.1–9.2)
NEUTROPHILS NFR BLD AUTO: 68.8 %
NRBC BLD AUTO-RTO: 0 %
PLATELET # BLD AUTO: 192 X10(3)/MCL (ref 130–400)
PMV BLD AUTO: 9.3 FL (ref 7.4–10.4)
POTASSIUM SERPL-SCNC: 4.5 MMOL/L (ref 3.5–5.1)
PROT SERPL-MCNC: 6.7 GM/DL (ref 6.4–8.3)
RBC # BLD AUTO: 3.98 X10(6)/MCL (ref 4.7–6.1)
SODIUM SERPL-SCNC: 138 MMOL/L (ref 136–145)
T PALLIDUM AB SER QL: REACTIVE
WBC # BLD AUTO: 6.9 X10(3)/MCL (ref 4.5–11.5)

## 2025-03-19 PROCEDURE — 86780 TREPONEMA PALLIDUM: CPT

## 2025-03-19 PROCEDURE — 85025 COMPLETE CBC W/AUTO DIFF WBC: CPT

## 2025-03-19 PROCEDURE — 82306 VITAMIN D 25 HYDROXY: CPT

## 2025-03-19 PROCEDURE — 80053 COMPREHEN METABOLIC PANEL: CPT

## 2025-03-19 PROCEDURE — 87536 HIV-1 QUANT&REVRSE TRNSCRPJ: CPT

## 2025-03-19 PROCEDURE — 36415 COLL VENOUS BLD VENIPUNCTURE: CPT

## 2025-03-19 PROCEDURE — 86361 T CELL ABSOLUTE COUNT: CPT

## 2025-03-19 PROCEDURE — 86592 SYPHILIS TEST NON-TREP QUAL: CPT

## 2025-03-20 LAB
RPR SER QL: REACTIVE
RPR SER-TITR: ABNORMAL {TITER}

## 2025-03-21 LAB
AGE: 34
CD3+CD4+ CELLS # SPEC: 577 UNIT/L (ref 589–1505)
CD3+CD4+ CELLS NFR BLD: 39.8 %
LYMPHOCYTES # BLD AUTO: 1449 X10(3)/MCL (ref 1260–5520)
LYMPHOCYTES NFR LN MANUAL: 21 % (ref 28–48)
LYMPHOMA - T-CELL MARKERS SPEC-IMP: ABNORMAL
WBC # BLD AUTO: 6900 /MM3 (ref 4500–11500)

## 2025-03-22 LAB — HIV1 RNA # PLAS NAA DL=20: NORMAL COPIES/ML

## 2025-05-02 ENCOUNTER — PATIENT MESSAGE (OUTPATIENT)
Dept: INFECTIOUS DISEASES | Facility: CLINIC | Age: 35
End: 2025-05-02
Payer: COMMERCIAL

## 2025-05-02 DIAGNOSIS — F41.8 DEPRESSION WITH ANXIETY: ICD-10-CM

## 2025-05-02 DIAGNOSIS — T78.40XS ALLERGY, SEQUELA: ICD-10-CM

## 2025-05-02 RX ORDER — LORATADINE 10 MG/1
10 TABLET ORAL DAILY
Qty: 90 TABLET | Refills: 3 | Status: CANCELLED | OUTPATIENT
Start: 2025-05-02 | End: 2026-05-02

## 2025-05-02 RX ORDER — FLUOXETINE 10 MG/1
10 CAPSULE ORAL DAILY
Qty: 90 CAPSULE | Refills: 0 | Status: SHIPPED | OUTPATIENT
Start: 2025-05-02

## 2025-05-02 RX ORDER — LORATADINE 10 MG/1
10 TABLET ORAL DAILY
Qty: 90 TABLET | Refills: 0 | Status: SHIPPED | OUTPATIENT
Start: 2025-05-02

## 2025-05-02 RX ORDER — FLUOXETINE 10 MG/1
10 CAPSULE ORAL DAILY
Qty: 90 CAPSULE | Refills: 3 | Status: CANCELLED | OUTPATIENT
Start: 2025-05-02 | End: 2026-05-02

## 2025-05-02 NOTE — TELEPHONE ENCOUNTER
Last visit with Margaux Hardy, APRN: 3/11/2025  Last visit in [facility] INFECTIOUS DISEASE: 3/11/2025    Patient's next visit in [facility] INFECTIOUS DISEASE: 8/4/2025     LL 03/19/2025    Please advise on medication refills. States did try to transfer rxs and was not allowed.

## 2025-05-02 NOTE — TELEPHONE ENCOUNTER
Last visit with Margaux Hardy, APRN: 3/11/2025  Last visit in [facility] INFECTIOUS DISEASE: 3/11/2025    Patient's next visit in [facility] INFECTIOUS DISEASE: 8/4/2025     LL 03/19/2025    Message sent to patient via portal regarding medication transfer.

## 2025-08-04 DIAGNOSIS — T78.40XS ALLERGY, SEQUELA: ICD-10-CM

## 2025-08-04 DIAGNOSIS — F41.8 DEPRESSION WITH ANXIETY: ICD-10-CM

## 2025-08-04 RX ORDER — LORATADINE 10 MG/1
10 TABLET ORAL DAILY
Qty: 90 TABLET | Refills: 0 | Status: SHIPPED | OUTPATIENT
Start: 2025-08-04

## 2025-08-04 RX ORDER — FLUOXETINE 10 MG/1
10 CAPSULE ORAL DAILY
Qty: 90 CAPSULE | Refills: 0 | Status: SHIPPED | OUTPATIENT
Start: 2025-08-04